# Patient Record
Sex: MALE | Race: OTHER | NOT HISPANIC OR LATINO | URBAN - METROPOLITAN AREA
[De-identification: names, ages, dates, MRNs, and addresses within clinical notes are randomized per-mention and may not be internally consistent; named-entity substitution may affect disease eponyms.]

---

## 2021-05-13 ENCOUNTER — INPATIENT (INPATIENT)
Facility: HOSPITAL | Age: 36
LOS: 0 days | Discharge: HOME | End: 2021-05-14
Attending: SURGERY | Admitting: SURGERY
Payer: COMMERCIAL

## 2021-05-13 VITALS
OXYGEN SATURATION: 99 % | SYSTOLIC BLOOD PRESSURE: 117 MMHG | DIASTOLIC BLOOD PRESSURE: 67 MMHG | RESPIRATION RATE: 18 BRPM | WEIGHT: 164.91 LBS | TEMPERATURE: 98 F | HEART RATE: 92 BPM

## 2021-05-13 LAB
ANION GAP SERPL CALC-SCNC: 10 MMOL/L — SIGNIFICANT CHANGE UP (ref 7–14)
ANION GAP SERPL CALC-SCNC: 9 MMOL/L — SIGNIFICANT CHANGE UP (ref 7–14)
APTT BLD: 41.2 SEC — HIGH (ref 27–39.2)
BASOPHILS # BLD AUTO: 0.04 K/UL — SIGNIFICANT CHANGE UP (ref 0–0.2)
BASOPHILS # BLD AUTO: 0.05 K/UL — SIGNIFICANT CHANGE UP (ref 0–0.2)
BASOPHILS NFR BLD AUTO: 0.6 % — SIGNIFICANT CHANGE UP (ref 0–1)
BASOPHILS NFR BLD AUTO: 0.6 % — SIGNIFICANT CHANGE UP (ref 0–1)
BLD GP AB SCN SERPL QL: SIGNIFICANT CHANGE UP
BUN SERPL-MCNC: 25 MG/DL — HIGH (ref 10–20)
BUN SERPL-MCNC: 26 MG/DL — HIGH (ref 10–20)
CALCIUM SERPL-MCNC: 9.1 MG/DL — SIGNIFICANT CHANGE UP (ref 8.5–10.1)
CALCIUM SERPL-MCNC: 9.7 MG/DL — SIGNIFICANT CHANGE UP (ref 8.5–10.1)
CHLORIDE SERPL-SCNC: 102 MMOL/L — SIGNIFICANT CHANGE UP (ref 98–110)
CHLORIDE SERPL-SCNC: 105 MMOL/L — SIGNIFICANT CHANGE UP (ref 98–110)
CO2 SERPL-SCNC: 26 MMOL/L — SIGNIFICANT CHANGE UP (ref 17–32)
CO2 SERPL-SCNC: 29 MMOL/L — SIGNIFICANT CHANGE UP (ref 17–32)
CREAT SERPL-MCNC: 1 MG/DL — SIGNIFICANT CHANGE UP (ref 0.7–1.5)
CREAT SERPL-MCNC: 1.1 MG/DL — SIGNIFICANT CHANGE UP (ref 0.7–1.5)
EOSINOPHIL # BLD AUTO: 0.15 K/UL — SIGNIFICANT CHANGE UP (ref 0–0.7)
EOSINOPHIL # BLD AUTO: 0.25 K/UL — SIGNIFICANT CHANGE UP (ref 0–0.7)
EOSINOPHIL NFR BLD AUTO: 1.8 % — SIGNIFICANT CHANGE UP (ref 0–8)
EOSINOPHIL NFR BLD AUTO: 3.6 % — SIGNIFICANT CHANGE UP (ref 0–8)
GLUCOSE SERPL-MCNC: 106 MG/DL — HIGH (ref 70–99)
GLUCOSE SERPL-MCNC: 91 MG/DL — SIGNIFICANT CHANGE UP (ref 70–99)
HCT VFR BLD CALC: 40.9 % — LOW (ref 42–52)
HCT VFR BLD CALC: 45.1 % — SIGNIFICANT CHANGE UP (ref 42–52)
HGB BLD-MCNC: 14.2 G/DL — SIGNIFICANT CHANGE UP (ref 14–18)
HGB BLD-MCNC: 16.2 G/DL — SIGNIFICANT CHANGE UP (ref 14–18)
IMM GRANULOCYTES NFR BLD AUTO: 0.1 % — SIGNIFICANT CHANGE UP (ref 0.1–0.3)
IMM GRANULOCYTES NFR BLD AUTO: 0.1 % — SIGNIFICANT CHANGE UP (ref 0.1–0.3)
INR BLD: 1.22 RATIO — SIGNIFICANT CHANGE UP (ref 0.65–1.3)
LYMPHOCYTES # BLD AUTO: 2.46 K/UL — SIGNIFICANT CHANGE UP (ref 1.2–3.4)
LYMPHOCYTES # BLD AUTO: 2.63 K/UL — SIGNIFICANT CHANGE UP (ref 1.2–3.4)
LYMPHOCYTES # BLD AUTO: 28.8 % — SIGNIFICANT CHANGE UP (ref 20.5–51.1)
LYMPHOCYTES # BLD AUTO: 38.3 % — SIGNIFICANT CHANGE UP (ref 20.5–51.1)
MAGNESIUM SERPL-MCNC: 1.9 MG/DL — SIGNIFICANT CHANGE UP (ref 1.8–2.4)
MCHC RBC-ENTMCNC: 29.3 PG — SIGNIFICANT CHANGE UP (ref 27–31)
MCHC RBC-ENTMCNC: 30.1 PG — SIGNIFICANT CHANGE UP (ref 27–31)
MCHC RBC-ENTMCNC: 34.7 G/DL — SIGNIFICANT CHANGE UP (ref 32–37)
MCHC RBC-ENTMCNC: 35.9 G/DL — SIGNIFICANT CHANGE UP (ref 32–37)
MCV RBC AUTO: 83.7 FL — SIGNIFICANT CHANGE UP (ref 80–94)
MCV RBC AUTO: 84.3 FL — SIGNIFICANT CHANGE UP (ref 80–94)
MONOCYTES # BLD AUTO: 0.65 K/UL — HIGH (ref 0.1–0.6)
MONOCYTES # BLD AUTO: 0.69 K/UL — HIGH (ref 0.1–0.6)
MONOCYTES NFR BLD AUTO: 10 % — HIGH (ref 1.7–9.3)
MONOCYTES NFR BLD AUTO: 7.6 % — SIGNIFICANT CHANGE UP (ref 1.7–9.3)
NEUTROPHILS # BLD AUTO: 3.25 K/UL — SIGNIFICANT CHANGE UP (ref 1.4–6.5)
NEUTROPHILS # BLD AUTO: 5.22 K/UL — SIGNIFICANT CHANGE UP (ref 1.4–6.5)
NEUTROPHILS NFR BLD AUTO: 47.4 % — SIGNIFICANT CHANGE UP (ref 42.2–75.2)
NEUTROPHILS NFR BLD AUTO: 61.1 % — SIGNIFICANT CHANGE UP (ref 42.2–75.2)
NRBC # BLD: 0 /100 WBCS — SIGNIFICANT CHANGE UP (ref 0–0)
NRBC # BLD: 0 /100 WBCS — SIGNIFICANT CHANGE UP (ref 0–0)
PHOSPHATE SERPL-MCNC: 4.8 MG/DL — SIGNIFICANT CHANGE UP (ref 2.1–4.9)
PLATELET # BLD AUTO: 127 K/UL — LOW (ref 130–400)
PLATELET # BLD AUTO: 143 K/UL — SIGNIFICANT CHANGE UP (ref 130–400)
POTASSIUM SERPL-MCNC: 3.8 MMOL/L — SIGNIFICANT CHANGE UP (ref 3.5–5)
POTASSIUM SERPL-MCNC: 4 MMOL/L — SIGNIFICANT CHANGE UP (ref 3.5–5)
POTASSIUM SERPL-SCNC: 3.8 MMOL/L — SIGNIFICANT CHANGE UP (ref 3.5–5)
POTASSIUM SERPL-SCNC: 4 MMOL/L — SIGNIFICANT CHANGE UP (ref 3.5–5)
PROTHROM AB SERPL-ACNC: 14 SEC — HIGH (ref 9.95–12.87)
RAPID RVP RESULT: SIGNIFICANT CHANGE UP
RBC # BLD: 4.85 M/UL — SIGNIFICANT CHANGE UP (ref 4.7–6.1)
RBC # BLD: 5.39 M/UL — SIGNIFICANT CHANGE UP (ref 4.7–6.1)
RBC # FLD: 11.9 % — SIGNIFICANT CHANGE UP (ref 11.5–14.5)
RBC # FLD: 12 % — SIGNIFICANT CHANGE UP (ref 11.5–14.5)
SARS-COV-2 RNA SPEC QL NAA+PROBE: SIGNIFICANT CHANGE UP
SODIUM SERPL-SCNC: 138 MMOL/L — SIGNIFICANT CHANGE UP (ref 135–146)
SODIUM SERPL-SCNC: 143 MMOL/L — SIGNIFICANT CHANGE UP (ref 135–146)
WBC # BLD: 6.87 K/UL — SIGNIFICANT CHANGE UP (ref 4.8–10.8)
WBC # BLD: 8.54 K/UL — SIGNIFICANT CHANGE UP (ref 4.8–10.8)
WBC # FLD AUTO: 6.87 K/UL — SIGNIFICANT CHANGE UP (ref 4.8–10.8)
WBC # FLD AUTO: 8.54 K/UL — SIGNIFICANT CHANGE UP (ref 4.8–10.8)

## 2021-05-13 PROCEDURE — 71045 X-RAY EXAM CHEST 1 VIEW: CPT | Mod: 26

## 2021-05-13 PROCEDURE — 93010 ELECTROCARDIOGRAM REPORT: CPT

## 2021-05-13 PROCEDURE — 99284 EMERGENCY DEPT VISIT MOD MDM: CPT

## 2021-05-13 RX ORDER — ACETAMINOPHEN 500 MG
650 TABLET ORAL EVERY 6 HOURS
Refills: 0 | Status: DISCONTINUED | OUTPATIENT
Start: 2021-05-13 | End: 2021-05-14

## 2021-05-13 RX ORDER — ACETAMINOPHEN 500 MG
975 TABLET ORAL ONCE
Refills: 0 | Status: COMPLETED | OUTPATIENT
Start: 2021-05-13 | End: 2021-05-13

## 2021-05-13 RX ORDER — OXYCODONE HYDROCHLORIDE 5 MG/1
5 TABLET ORAL EVERY 6 HOURS
Refills: 0 | Status: DISCONTINUED | OUTPATIENT
Start: 2021-05-13 | End: 2021-05-14

## 2021-05-13 RX ORDER — CIPROFLOXACIN LACTATE 400MG/40ML
400 VIAL (ML) INTRAVENOUS EVERY 12 HOURS
Refills: 0 | Status: DISCONTINUED | OUTPATIENT
Start: 2021-05-14 | End: 2021-05-14

## 2021-05-13 RX ORDER — SENNA PLUS 8.6 MG/1
2 TABLET ORAL AT BEDTIME
Refills: 0 | Status: DISCONTINUED | OUTPATIENT
Start: 2021-05-13 | End: 2021-05-14

## 2021-05-13 RX ORDER — METRONIDAZOLE 500 MG
TABLET ORAL
Refills: 0 | Status: DISCONTINUED | OUTPATIENT
Start: 2021-05-13 | End: 2021-05-14

## 2021-05-13 RX ORDER — ENOXAPARIN SODIUM 100 MG/ML
40 INJECTION SUBCUTANEOUS DAILY
Refills: 0 | Status: DISCONTINUED | OUTPATIENT
Start: 2021-05-13 | End: 2021-05-14

## 2021-05-13 RX ORDER — PANTOPRAZOLE SODIUM 20 MG/1
40 TABLET, DELAYED RELEASE ORAL
Refills: 0 | Status: DISCONTINUED | OUTPATIENT
Start: 2021-05-13 | End: 2021-05-14

## 2021-05-13 RX ORDER — IBUPROFEN 200 MG
600 TABLET ORAL ONCE
Refills: 0 | Status: COMPLETED | OUTPATIENT
Start: 2021-05-13 | End: 2021-05-13

## 2021-05-13 RX ORDER — CIPROFLOXACIN LACTATE 400MG/40ML
400 VIAL (ML) INTRAVENOUS ONCE
Refills: 0 | Status: COMPLETED | OUTPATIENT
Start: 2021-05-13 | End: 2021-05-13

## 2021-05-13 RX ORDER — CIPROFLOXACIN LACTATE 400MG/40ML
VIAL (ML) INTRAVENOUS
Refills: 0 | Status: DISCONTINUED | OUTPATIENT
Start: 2021-05-13 | End: 2021-05-14

## 2021-05-13 RX ORDER — METRONIDAZOLE 500 MG
500 TABLET ORAL ONCE
Refills: 0 | Status: COMPLETED | OUTPATIENT
Start: 2021-05-13 | End: 2021-05-13

## 2021-05-13 RX ORDER — CHLORHEXIDINE GLUCONATE 213 G/1000ML
1 SOLUTION TOPICAL
Refills: 0 | Status: DISCONTINUED | OUTPATIENT
Start: 2021-05-13 | End: 2021-05-14

## 2021-05-13 RX ORDER — IBUPROFEN 200 MG
400 TABLET ORAL EVERY 6 HOURS
Refills: 0 | Status: DISCONTINUED | OUTPATIENT
Start: 2021-05-13 | End: 2021-05-14

## 2021-05-13 RX ORDER — METRONIDAZOLE 500 MG
500 TABLET ORAL EVERY 8 HOURS
Refills: 0 | Status: DISCONTINUED | OUTPATIENT
Start: 2021-05-14 | End: 2021-05-14

## 2021-05-13 RX ORDER — SODIUM CHLORIDE 9 MG/ML
1000 INJECTION, SOLUTION INTRAVENOUS
Refills: 0 | Status: DISCONTINUED | OUTPATIENT
Start: 2021-05-13 | End: 2021-05-14

## 2021-05-13 RX ADMIN — Medication 200 MILLIGRAM(S): at 19:27

## 2021-05-13 RX ADMIN — ENOXAPARIN SODIUM 40 MILLIGRAM(S): 100 INJECTION SUBCUTANEOUS at 19:15

## 2021-05-13 RX ADMIN — Medication 650 MILLIGRAM(S): at 19:15

## 2021-05-13 RX ADMIN — Medication 650 MILLIGRAM(S): at 19:16

## 2021-05-13 RX ADMIN — SENNA PLUS 2 TABLET(S): 8.6 TABLET ORAL at 21:29

## 2021-05-13 RX ADMIN — Medication 100 MILLIGRAM(S): at 19:16

## 2021-05-13 NOTE — ED PROVIDER NOTE - PHYSICAL EXAMINATION
CONSTITUTIONAL: Well-developed; well-nourished; in no acute distress, nontoxic appearing  SKIN: skin exam is warm and dry  ENT: MMM  NECK: ROM intact.  CARD: S1, S2 normal, no murmur  RESP: No wheezes, rales or rhonchi. Good air movement bilaterally  ABD: soft; non-distended; non-tender. No Rebound, No guarding. JOSE: thrombosed, strangulated hernia, no active drainage. Chaperone: Jina  EXT: Normal ROM.   NEURO: awake, alert, following commands, oriented, grossly unremarkable. No Focal deficits. GCS 15.   PSYCH: Cooperative, appropriate.

## 2021-05-13 NOTE — H&P ADULT - ATTENDING COMMENTS
above noted discussed case with surgical resident abdomen soft no distension rectal exam shows large thrombosed hemmorhoids extremly painful for surgery in am

## 2021-05-13 NOTE — ED PROVIDER NOTE - PROGRESS NOTE DETAILS
edrek: patient evaluated by surgery, will admit to [facility] for operative intervention of thrombosed hemorrhoid Attending Note: 34 y/o M presents to ED c/o rectal pain x few days. Pt has multiple thrombosed hemorrhoids.  Denies fever, n/v/d, or abdominal pain. Pt seen by surgery at bedside. Will admit for operative intervention under anesthesia.

## 2021-05-13 NOTE — ED PROVIDER NOTE - CLINICAL SUMMARY MEDICAL DECISION MAKING FREE TEXT BOX
Pt has multiple thrombosed hemorrhoids.  Denies fever, n/v/d, or abdominal pain. Pt seen by surgery at bedside. Will admit for operative intervention under anesthesia.

## 2021-05-13 NOTE — ED PROVIDER NOTE - OBJECTIVE STATEMENT
35 year old male, no past medical history, who presents with hemorrhoids. patient reports straining during BM resulting in hemorrhoid, previously was able to reduce hemorrhoid, however, has been unable to do so x2 days. patient reports persistent, worsening pain to rectum. denies f/c, nausea/vomiting/diarrhea, abd pain.

## 2021-05-13 NOTE — ED PROVIDER NOTE - NS ED ROS FT
Review of Systems:  	•	CONSTITUTIONAL: no fever, no diaphoresis, no chills  	•	SKIN: no rash  	•	HEMATOLOGIC: no bleeding, no bruising  	•	ENT: no sore throat  	•	RESPIRATORY: no shortness of breath, no cough  	•	CARDIAC: no chest pain, no palpitations  	•	GI: +hemorrhoids, no abd pain/nausea/vomiting/diarrhea  	•	MUSCULOSKELETAL: no joint paint, no swelling, no redness  	•	NEUROLOGIC: no weakness, no headache

## 2021-05-13 NOTE — H&P ADULT - HISTORY OF PRESENT ILLNESS
35M w/ PMH of hemorrhoids who presented to the ED with 2 days of worsening prolapsed hemorrhoids. Pt reports long history of hemorrhoids, never treated surgically, just with topical creams. In the past 2 days, he has had increasing rectal pain, bleeding, and prolapse of hemorrhoids. Referred to come into ED by his PCP. Admitted for surgery tomorrow.

## 2021-05-13 NOTE — H&P ADULT - ASSESSMENT
35M w/ PMH of hemorrhoids who presented to the ED with 2 days of worsening prolapsed hemorrhoids. Pt reports long history of hemorrhoids, never treated surgically, just with topical creams. In the past 2 days, he has had increasing rectal pain, bleeding, and prolapse of hemorrhoids. Referred to come into ED by his PCP. Admitted for surgery tomorrow.     PLAN:   - cipro/flagyl  - NPO, IVF at midnight  - booked for OR tomorrow w/ Dr. Javier at 7:30am for exam under anesthesia and hemorrhoidectomy  - consent in chart  - d/w pt and Dr. Javier

## 2021-05-13 NOTE — ED ADULT NURSE NOTE - OBJECTIVE STATEMENT
Pt presented with c/o rectal pain/hemorrhoids. Pt states that he has been able to decompress them himself at home, but now states pain has increased. Pt went to PCP who sent to ED for surgical eval.

## 2021-05-13 NOTE — H&P ADULT - NSHPLABSRESULTS_GEN_ALL_CORE
Labs:             16.2   8.54  )-----------( 143      ( 13 May 2021 17:14 )             45.1       Auto Neutrophil %: 61.1 % (05-13-21 @ 17:14)  Auto Immature Granulocyte %: 0.1 % (05-13-21 @ 17:14)    05-13    138  |  102  |  25<H>  ----------------------------<  91  4.0   |  26  |  1.0    Calcium, Total Serum: 9.7 mg/dL (05-13-21 @ 17:14)

## 2021-05-13 NOTE — H&P ADULT - NSHPPHYSICALEXAM_GEN_ALL_CORE
GENERAL: NAD, well-appearing  CHEST/LUNG: Clear to auscultation bilaterally  HEART: Regular rate and rhythm  ABDOMEN: Soft, Nontender, Nondistended; thrombosed external/internal hemorrhoids, exquisitely TTP  EXTREMITIES: No clubbing, cyanosis, or edema

## 2021-05-14 VITALS
SYSTOLIC BLOOD PRESSURE: 109 MMHG | TEMPERATURE: 97 F | DIASTOLIC BLOOD PRESSURE: 62 MMHG | HEART RATE: 94 BPM | RESPIRATION RATE: 17 BRPM

## 2021-05-14 LAB
COVID-19 SPIKE DOMAIN AB INTERP: POSITIVE
COVID-19 SPIKE DOMAIN ANTIBODY RESULT: >250 U/ML — HIGH
SARS-COV-2 IGG+IGM SERPL QL IA: >250 U/ML — HIGH
SARS-COV-2 IGG+IGM SERPL QL IA: POSITIVE

## 2021-05-14 PROCEDURE — 88304 TISSUE EXAM BY PATHOLOGIST: CPT | Mod: 26

## 2021-05-14 PROCEDURE — 88360 TUMOR IMMUNOHISTOCHEM/MANUAL: CPT | Mod: 26

## 2021-05-14 PROCEDURE — 88341 IMHCHEM/IMCYTCHM EA ADD ANTB: CPT | Mod: 26,59

## 2021-05-14 PROCEDURE — 88342 IMHCHEM/IMCYTCHM 1ST ANTB: CPT | Mod: 26,59

## 2021-05-14 RX ORDER — SENNA PLUS 8.6 MG/1
2 TABLET ORAL AT BEDTIME
Refills: 0 | Status: DISCONTINUED | OUTPATIENT
Start: 2021-05-14 | End: 2021-05-14

## 2021-05-14 RX ORDER — ACETAMINOPHEN 500 MG
650 TABLET ORAL EVERY 6 HOURS
Refills: 0 | Status: DISCONTINUED | OUTPATIENT
Start: 2021-05-14 | End: 2021-05-14

## 2021-05-14 RX ORDER — HYDROMORPHONE HYDROCHLORIDE 2 MG/ML
0.5 INJECTION INTRAMUSCULAR; INTRAVENOUS; SUBCUTANEOUS EVERY 6 HOURS
Refills: 0 | Status: DISCONTINUED | OUTPATIENT
Start: 2021-05-14 | End: 2021-05-14

## 2021-05-14 RX ORDER — CIPROFLOXACIN LACTATE 400MG/40ML
400 VIAL (ML) INTRAVENOUS EVERY 12 HOURS
Refills: 0 | Status: DISCONTINUED | OUTPATIENT
Start: 2021-05-14 | End: 2021-05-14

## 2021-05-14 RX ORDER — CHLORHEXIDINE GLUCONATE 213 G/1000ML
1 SOLUTION TOPICAL
Refills: 0 | Status: DISCONTINUED | OUTPATIENT
Start: 2021-05-14 | End: 2021-05-14

## 2021-05-14 RX ORDER — IBUPROFEN 200 MG
400 TABLET ORAL EVERY 6 HOURS
Refills: 0 | Status: DISCONTINUED | OUTPATIENT
Start: 2021-05-14 | End: 2021-05-14

## 2021-05-14 RX ORDER — METRONIDAZOLE 500 MG
500 TABLET ORAL EVERY 8 HOURS
Refills: 0 | Status: DISCONTINUED | OUTPATIENT
Start: 2021-05-14 | End: 2021-05-14

## 2021-05-14 RX ORDER — SENNA PLUS 8.6 MG/1
2 TABLET ORAL
Qty: 0 | Refills: 0 | DISCHARGE
Start: 2021-05-14

## 2021-05-14 RX ORDER — OXYCODONE HYDROCHLORIDE 5 MG/1
5 TABLET ORAL EVERY 6 HOURS
Refills: 0 | Status: DISCONTINUED | OUTPATIENT
Start: 2021-05-14 | End: 2021-05-14

## 2021-05-14 RX ORDER — PANTOPRAZOLE SODIUM 20 MG/1
40 TABLET, DELAYED RELEASE ORAL
Refills: 0 | Status: DISCONTINUED | OUTPATIENT
Start: 2021-05-14 | End: 2021-05-14

## 2021-05-14 RX ORDER — ENOXAPARIN SODIUM 100 MG/ML
40 INJECTION SUBCUTANEOUS DAILY
Refills: 0 | Status: DISCONTINUED | OUTPATIENT
Start: 2021-05-14 | End: 2021-05-14

## 2021-05-14 RX ADMIN — PANTOPRAZOLE SODIUM 40 MILLIGRAM(S): 20 TABLET, DELAYED RELEASE ORAL at 05:24

## 2021-05-14 RX ADMIN — OXYCODONE HYDROCHLORIDE 5 MILLIGRAM(S): 5 TABLET ORAL at 18:29

## 2021-05-14 RX ADMIN — Medication 650 MILLIGRAM(S): at 02:55

## 2021-05-14 RX ADMIN — Medication 400 MILLIGRAM(S): at 00:00

## 2021-05-14 RX ADMIN — Medication 100 MILLIGRAM(S): at 05:24

## 2021-05-14 RX ADMIN — ENOXAPARIN SODIUM 40 MILLIGRAM(S): 100 INJECTION SUBCUTANEOUS at 11:38

## 2021-05-14 RX ADMIN — Medication 650 MILLIGRAM(S): at 05:24

## 2021-05-14 RX ADMIN — Medication 100 MILLIGRAM(S): at 15:18

## 2021-05-14 RX ADMIN — Medication 200 MILLIGRAM(S): at 17:17

## 2021-05-14 RX ADMIN — Medication 400 MILLIGRAM(S): at 17:17

## 2021-05-14 RX ADMIN — Medication 400 MILLIGRAM(S): at 05:24

## 2021-05-14 RX ADMIN — Medication 400 MILLIGRAM(S): at 18:11

## 2021-05-14 NOTE — DISCHARGE NOTE PROVIDER - NSDCCPCAREPLAN_GEN_ALL_CORE_FT
PRINCIPAL DISCHARGE DIAGNOSIS  Diagnosis: Hemorrhoids, thrombosed  Assessment and Plan of Treatment:       SECONDARY DISCHARGE DIAGNOSES  Diagnosis: Strangulated hemorrhoids  Assessment and Plan of Treatment:

## 2021-05-14 NOTE — DISCHARGE NOTE PROVIDER - CARE PROVIDER_API CALL
Katia Javier  SURGERY  [address] [place] [address]  Phone: [phone]  Fax: [phone]  Follow Up Time: 2 weeks

## 2021-05-14 NOTE — DISCHARGE NOTE NURSING/CASE MANAGEMENT/SOCIAL WORK - PATIENT PORTAL LINK FT
You can access the FollowMyHealth Patient Portal offered by [facility] by registering at the following website: [email or web address]. By joining [employer]’s FollowMyHealth portal, you will also be able to view your health information using other applications (apps) compatible with our system.

## 2021-05-14 NOTE — CHART NOTE - NSCHARTNOTEFT_GEN_A_CORE
PACU ANESTHESIA ADMISSION NOTE      Procedure: Hemorrhoidectomy, thrombosed      Post op diagnosis:  Hemorrhoids        ____  Intubated  TV:______       Rate: ______      FiO2: ______    __x__  Patent Airway    __x__  Full return of protective reflexes    __x__  Full recovery from anesthesia / back to baseline     Vitals:   See Anesthesia record  T- 97.8n P- 61 R- 17 B/P- 124/73 SPO2- 96% on RA    Mental Status:  __x__ Awake   _____ Alert   __x___ Drowsy   _____ Sedated    Nausea/Vomiting:  __x__ NO  ______Yes,   See Post - Op Orders          Pain Scale (0-10):  __0___    Treatment: ____ None    ____ See Post - Op/PCA Orders    Post - Operative Fluids:   ____ Oral   __x__ See Post - Op Orders    Plan: Discharge:   ____Home       __x___Floor     _____Critical Care    _____  Other:_________________    Comments: No anesthesia complications noted. Discharge/READMIT to FLOOR when criteria met.

## 2021-05-14 NOTE — PROGRESS NOTE ADULT - SUBJECTIVE AND OBJECTIVE BOX
GENERAL SURGERY PROGRESS NOTE     ADAM KIMBROUGH  35y  Male  Hospital day :1d    OVERNIGHT EVENTS: none    T(F): 96.3 (05-14-21 @ 00:58), Max: 98.5 (05-13-21 @ 14:30)  HR: 60 (05-14-21 @ 00:58) (60 - 92)  BP: 91/51 (05-14-21 @ 00:58) (91/51 - 177/64)  ABP: --  ABP(mean): --  RR: 18 (05-14-21 @ 00:58) (18 - 18)  SpO2: 99% (05-13-21 @ 14:30) (99% - 99%)    DIET/FLUIDS: lactated ringers. 1000 milliLiter(s) IV Continuous <Continuous>    GI proph:  pantoprazole    Tablet 40 milliGRAM(s) Oral before breakfast    AC/ proph: enoxaparin Injectable 40 milliGRAM(s) SubCutaneous daily    ABx: ciprofloxacin   IVPB 400 milliGRAM(s) IV Intermittent every 12 hours  ciprofloxacin   IVPB      metroNIDAZOLE  IVPB 500 milliGRAM(s) IV Intermittent every 8 hours  metroNIDAZOLE  IVPB        PHYSICAL EXAM:  GENERAL: NAD, well-appearing  CHEST/LUNG: Clear to auscultation bilaterally  HEART: Regular rate and rhythm  ABDOMEN: Soft, Nontender, Nondistended;   EXTREMITIES:  No clubbing, cyanosis, or edema    LABS  Labs:  CAPILLARY BLOOD GLUCOSE                      14.2   6.87  )-----------( 127      ( 13 May 2021 22:09 )             40.9       Auto Neutrophil %: 47.4 % (05-13-21 @ 22:09)  Auto Immature Granulocyte %: 0.1 % (05-13-21 @ 22:09)  Auto Neutrophil %: 61.1 % (05-13-21 @ 17:14)  Auto Immature Granulocyte %: 0.1 % (05-13-21 @ 17:14)    05-13    143  |  105  |  26<H>  ----------------------------<  106<H>  3.8   |  29  |  1.1    Calcium, Total Serum: 9.1 mg/dL (05-13-21 @ 22:09)    Coags:     14.00  ----< 1.22    ( 13 May 2021 22:09 )     41.2      RADIOLOGY & ADDITIONAL TESTS: reviewed    A/P: 34 yo M admitted with thrombosed hemorrhoids  -OR today for EUA/hemorrhoidectomy  -pain control prn  -IS, OOB ad avni  -NPO, IVF  -DVT prophpylaxis  -antibiotics for prophylaxis

## 2021-05-14 NOTE — DISCHARGE NOTE PROVIDER - HOSPITAL COURSE
35 year old male s/p hemorrhoidectomy for prolapsed hemorrhoids. Patient given discharge instructions and told to follow up with Dr. Javier in 2 weeks

## 2021-05-14 NOTE — DISCHARGE NOTE NURSING/CASE MANAGEMENT/SOCIAL WORK - REASON VACCINE NOT RECEIVED
pt has appointment scheduled for tomorrow for 2nd dose of moderna, we only have J&J currently as per MIQUEL TROTTER

## 2021-05-14 NOTE — DISCHARGE NOTE PROVIDER - NSDCMRMEDTOKEN_GEN_ALL_CORE_FT
oxycodone-acetaminophen 5 mg-325 mg oral tablet: 1 tab(s) orally every 4 hours -for severe pain MDD:4   senna oral tablet: 2 tab(s) orally once a day (at bedtime)

## 2021-05-14 NOTE — PRE-ANESTHESIA EVALUATION ADULT - NSANTHADDINFOFT_GEN_ALL_CORE
Procedure/Risks discussed for GA with LMA v. ETT + routine monitoring. Patient understands the stated anesthetic plan and agrees to proceed.

## 2021-05-15 ENCOUNTER — EMERGENCY (EMERGENCY)
Facility: HOSPITAL | Age: 36
LOS: 0 days | Discharge: HOME | End: 2021-05-15
Attending: STUDENT IN AN ORGANIZED HEALTH CARE EDUCATION/TRAINING PROGRAM | Admitting: STUDENT IN AN ORGANIZED HEALTH CARE EDUCATION/TRAINING PROGRAM
Payer: COMMERCIAL

## 2021-05-15 VITALS
DIASTOLIC BLOOD PRESSURE: 89 MMHG | TEMPERATURE: 97 F | HEART RATE: 78 BPM | RESPIRATION RATE: 18 BRPM | OXYGEN SATURATION: 96 % | SYSTOLIC BLOOD PRESSURE: 127 MMHG

## 2021-05-15 DIAGNOSIS — K62.5 HEMORRHAGE OF ANUS AND RECTUM: ICD-10-CM

## 2021-05-15 DIAGNOSIS — Z98.890 OTHER SPECIFIED POSTPROCEDURAL STATES: ICD-10-CM

## 2021-05-15 DIAGNOSIS — K64.8 OTHER HEMORRHOIDS: ICD-10-CM

## 2021-05-15 PROBLEM — K64.9 UNSPECIFIED HEMORRHOIDS: Chronic | Status: ACTIVE | Noted: 2021-05-13

## 2021-05-15 LAB
BASOPHILS # BLD AUTO: 0.04 K/UL — SIGNIFICANT CHANGE UP (ref 0–0.2)
BASOPHILS NFR BLD AUTO: 0.4 % — SIGNIFICANT CHANGE UP (ref 0–1)
EOSINOPHIL # BLD AUTO: 0.17 K/UL — SIGNIFICANT CHANGE UP (ref 0–0.7)
EOSINOPHIL NFR BLD AUTO: 1.6 % — SIGNIFICANT CHANGE UP (ref 0–8)
HCT VFR BLD CALC: 42 % — SIGNIFICANT CHANGE UP (ref 42–52)
HGB BLD-MCNC: 14.7 G/DL — SIGNIFICANT CHANGE UP (ref 14–18)
IMM GRANULOCYTES NFR BLD AUTO: 0.3 % — SIGNIFICANT CHANGE UP (ref 0.1–0.3)
LYMPHOCYTES # BLD AUTO: 29.5 % — SIGNIFICANT CHANGE UP (ref 20.5–51.1)
LYMPHOCYTES # BLD AUTO: 3.23 K/UL — SIGNIFICANT CHANGE UP (ref 1.2–3.4)
MCHC RBC-ENTMCNC: 29.6 PG — SIGNIFICANT CHANGE UP (ref 27–31)
MCHC RBC-ENTMCNC: 35 G/DL — SIGNIFICANT CHANGE UP (ref 32–37)
MCV RBC AUTO: 84.7 FL — SIGNIFICANT CHANGE UP (ref 80–94)
MONOCYTES # BLD AUTO: 0.88 K/UL — HIGH (ref 0.1–0.6)
MONOCYTES NFR BLD AUTO: 8 % — SIGNIFICANT CHANGE UP (ref 1.7–9.3)
NEUTROPHILS # BLD AUTO: 6.61 K/UL — HIGH (ref 1.4–6.5)
NEUTROPHILS NFR BLD AUTO: 60.2 % — SIGNIFICANT CHANGE UP (ref 42.2–75.2)
NRBC # BLD: 0 /100 WBCS — SIGNIFICANT CHANGE UP (ref 0–0)
PLATELET # BLD AUTO: 132 K/UL — SIGNIFICANT CHANGE UP (ref 130–400)
RBC # BLD: 4.96 M/UL — SIGNIFICANT CHANGE UP (ref 4.7–6.1)
RBC # FLD: 11.9 % — SIGNIFICANT CHANGE UP (ref 11.5–14.5)
WBC # BLD: 10.96 K/UL — HIGH (ref 4.8–10.8)
WBC # FLD AUTO: 10.96 K/UL — HIGH (ref 4.8–10.8)

## 2021-05-15 PROCEDURE — 99284 EMERGENCY DEPT VISIT MOD MDM: CPT

## 2021-05-15 NOTE — ED PROVIDER NOTE - PHYSICAL EXAMINATION
CONSTITUTIONAL: Well-appearing; well-nourished; in no apparent distress.   NECK: Supple; non-tender; no cervical lymphadenopathy.  CARDIOVASCULAR: Normal S1, S2; no murmurs, rubs, or gallops.   RESPIRATORY: Normal chest excursion with respiration; breath sounds clear and equal bilaterally; no wheezes, rhonchi, or rales.  GI/: Normal bowel sounds; non-distended; non-tender; no palpable organomegaly.   Rectal exam chaperoned by OBDULIA Mcgregor: + small amount of bleeding noted from rectum.   MS: No evidence of trauma or deformity. Normal ROM in all four extremities; non-tender to palpation; distal pulses are normal.   SKIN: Normal for age and race; warm; dry; good turgor; no apparent lesions or exudate.   NEURO/PSYCH: A & O x 4; grossly unremarkable. mood and manner are appropriate.

## 2021-05-15 NOTE — ED ADULT TRIAGE NOTE - CHIEF COMPLAINT QUOTE
Patient stated he had surgery yesterday for hernioids and has had rectal bleeding since discharge Patient denies CP SOB and dizziness

## 2021-05-15 NOTE — ED PROVIDER NOTE - ATTENDING CONTRIBUTION TO CARE
36 yo m, s/p hemorrhoidectomy yesterday w/ Dr. Javier  pt had mild int bleeding last night. since 11am, bleeding has been constant, dripping. + mild pain to surg area. pt feels like he needs to have a BM and when he strains his bleeding gets worse.    vss  gen- NAD, aaox3  card-rrr  lungs-ctab, no wheezing or rhonchi  abd-sntnd, no guarding or rebound, Rectal- oozing from surgical site      well appearing, nml vs  will c/s surg to help manage post op bleeding/oozing 36 yo m, s/p hemorrhoidectomy yesterday w/ Dr. Javier  pt had mild int bleeding last night. since 11am, bleeding has been constant, dripping. + mild pain to surg area. pt feels like he needs to have a BM and when he strains his bleeding gets worse.     nml vs, cbc  will c/s surg to help manage post op bleeding/oozing

## 2021-05-15 NOTE — CONSULT NOTE ADULT - ASSESSMENT
35yM w/ PMHx of hemorrhoids s/p hemorrhoidectomy 5/14/21 who presented with pain and bleeding from the rectum. Physical exam findings, imaging, and labs as documented above.     PLAN:  - no acute surgical intervention  - follow up outpatient   - return to the ED if persistent bleeding occurs      35yM w/ PMHx of hemorrhoids s/p hemorrhoidectomy 5/14/21 who presented with pain and bleeding from the rectum. Physical exam findings, imaging, and labs as documented above. Hgb stable (14.2 from 14.7 pre op)    PLAN:  - no acute surgical intervention  - follow up outpatient   - return to the ED if persistent bleeding occurs      35yM w/ PMHx of hemorrhoids s/p hemorrhoidectomy 5/14/21 who presented with pain and bleeding from the rectum. Physical exam findings, imaging, and labs as documented above. Hgb stable (14.2 from 14.7 pre op)    PLAN:  - no acute surgical intervention  - follow up outpatient   - return to the ED if persistent bleeding occurs     Senior Resident Addendum  As above, with expected pain and bloody drainage since surgery, hgb stable from pre-op labs, incision examined, no active bleed noted, old clot evacuated. Patient reports desire for outpatient follow-up. Recommended to patient to avoid straining with bowel movements and to avoid constipation. No further intervention at this time, patient will follow-up in clinic this upcoming week.  Case d/w patient, wife, Dr. Javier, ED.

## 2021-05-15 NOTE — ED PROVIDER NOTE - CLINICAL SUMMARY MEDICAL DECISION MAKING FREE TEXT BOX
pt presented for oozing post hemorrhoidectomy. pt seen by gen surg, and cleared for outpt f/u. pt eloped prior to getting papers

## 2021-05-15 NOTE — CONSULT NOTE ADULT - SUBJECTIVE AND OBJECTIVE BOX
GENERAL SURGERY CONSULT NOTE    Patient: ADAM KIMBROUGH , 35y ([birth year])Male   MRN: [id number]  Location: [facility] ED  Visit: 05-15-21 Emergency  Date: 05-15-21 @ 17:21    HPI:  Patient is a 36 y/o M with PMHx of hemorrhoids s/p hemorrhoidectomy yesterday 5/14 with Dr. Javier. Patient states that he has been having increased pain and bleeding from his rectum since the surgery. States that he has the urge to defecate but cannot; only blood comes out. Has not been able to have a bowel movement since the surgery. He has been taking the pain medication that has been prescribed to him.     PAST MEDICAL & SURGICAL HISTORY:  Hemorrhoids    Home Medications:  senna oral tablet: 2 tab(s) orally once a day (at bedtime) (14 May 2021 17:57)        VITALS:  T(F): 97.3 (05-15-21 @ 14:07), Max: 97.3 (05-15-21 @ 14:07)  HR: 78 (05-15-21 @ 14:07) (78 - 78)  BP: 127/89 (05-15-21 @ 14:07) (127/89 - 127/89)  RR: 18 (05-15-21 @ 14:07) (18 - 18)  SpO2: 96% (05-15-21 @ 14:07) (96% - 96%)    PHYSICAL EXAM:  General: NAD, AAOx3, calm and cooperative  HEENT: NCAT, SONA, EOMI, Trachea ML, Neck supple  Cardiac: RRR S1, S2, no Murmurs, rubs or gallops  Respiratory: CTAB, normal respiratory effort, breath sounds equal BL, no wheeze, rhonchi or crackles  Abdomen: Soft, non-distended, non-tender, no rebound, no guarding. +BS.  Rectal: No active bleeding seen. One large clot present. Dried blood seen on the buttocks and gauze  Incision/wound: healing well, dressings in place, clean, dry and intact      LAB/STUDIES:                        14.7   10.96 )-----------( 132      ( 15 May 2021 15:01 )             42.0     05-13    143  |  105  |  26<H>  ----------------------------<  106<H>  3.8   |  29  |  1.1    Ca    9.1      13 May 2021 22:09  Phos  4.8     05-13  Mg     1.9     05-13      PT/INR - ( 13 May 2021 22:09 )   PT: 14.00 sec;   INR: 1.22 ratio         PTT - ( 13 May 2021 22:09 )  PTT:41.2 sec      IMAGING:  N/A

## 2021-05-15 NOTE — ED PROVIDER NOTE - NS ED ROS FT
Constitutional: no fever, chills, no recent weight loss, change in appetite or malaise  Cardiac: No chest pain, SOB or edema.  Respiratory: No cough or respiratory distress  GI: No nausea, vomiting, diarrhea or abdominal pain. + rectal bleeding  : No dysuria, frequency, urgency or hematuria  MS: no pain to back or extremities, no loss of ROM, no weakness  Neuro: No headache or weakness. No LOC.  Skin: No skin rash.  Endocrine: No history of thyroid disease or diabetes.  Except as documented in the HPI, all other systems are negative.

## 2021-05-15 NOTE — ED ADULT NURSE NOTE - NSIMPLEMENTINTERV_GEN_ALL_ED
Implemented All Universal Safety Interventions:  [place] to call system. Call bell, personal items and telephone within reach. Instruct patient to call for assistance. Room bathroom lighting operational. Non-slip footwear when patient is off stretcher. Physically safe environment: no spills, clutter or unnecessary equipment. Stretcher in lowest position, wheels locked, appropriate side rails in place.

## 2021-05-15 NOTE — ED PROVIDER NOTE - OBJECTIVE STATEMENT
35 year old M with hx of prolapsed hemorrhoid s/p hemorrhoidectomy yesterday with Dr. mccormick c/o rectal bleeding. Pt sts was having some intermittent rectal bleeding last night but since 11am this morning has had constant oozing. Pt sts has urge to have bowel movement but when he strains bleeding increases. No ac/ap use, abd pain, nausea, vomiting, fever/chills, lightheadedness/syncope/

## 2021-05-17 ENCOUNTER — EMERGENCY (EMERGENCY)
Facility: HOSPITAL | Age: 36
LOS: 0 days | Discharge: HOME | End: 2021-05-17
Attending: EMERGENCY MEDICINE | Admitting: EMERGENCY MEDICINE
Payer: COMMERCIAL

## 2021-05-17 VITALS
TEMPERATURE: 98 F | OXYGEN SATURATION: 100 % | RESPIRATION RATE: 18 BRPM | HEART RATE: 97 BPM | DIASTOLIC BLOOD PRESSURE: 74 MMHG | WEIGHT: 166.01 LBS | SYSTOLIC BLOOD PRESSURE: 117 MMHG

## 2021-05-17 DIAGNOSIS — K59.00 CONSTIPATION, UNSPECIFIED: ICD-10-CM

## 2021-05-17 PROCEDURE — 74018 RADEX ABDOMEN 1 VIEW: CPT | Mod: 26

## 2021-05-17 PROCEDURE — 99284 EMERGENCY DEPT VISIT MOD MDM: CPT

## 2021-05-17 RX ORDER — MAGNESIUM HYDROXIDE 400 MG/1
15 TABLET, CHEWABLE ORAL
Qty: 105 | Refills: 0
Start: 2021-05-17 | End: 2021-05-23

## 2021-05-17 RX ORDER — MINERAL OIL
1 OIL (ML) MISCELLANEOUS ONCE
Refills: 0 | Status: COMPLETED | OUTPATIENT
Start: 2021-05-17 | End: 2021-05-17

## 2021-05-17 RX ORDER — POLYETHYLENE GLYCOL 3350 17 G/17G
17 POWDER, FOR SOLUTION ORAL
Qty: 119 | Refills: 0
Start: 2021-05-17 | End: 2021-05-23

## 2021-05-17 RX ORDER — SENNA PLUS 8.6 MG/1
2 TABLET ORAL ONCE
Refills: 0 | Status: COMPLETED | OUTPATIENT
Start: 2021-05-17 | End: 2021-05-17

## 2021-05-17 RX ORDER — OXYCODONE AND ACETAMINOPHEN 5; 325 MG/1; MG/1
1 TABLET ORAL EVERY 4 HOURS
Refills: 0 | Status: DISCONTINUED | OUTPATIENT
Start: 2021-05-17 | End: 2021-05-17

## 2021-05-17 RX ORDER — MAGNESIUM HYDROXIDE 400 MG/1
30 TABLET, CHEWABLE ORAL ONCE
Refills: 0 | Status: COMPLETED | OUTPATIENT
Start: 2021-05-17 | End: 2021-05-17

## 2021-05-17 RX ADMIN — MAGNESIUM HYDROXIDE 30 MILLILITER(S): 400 TABLET, CHEWABLE ORAL at 16:22

## 2021-05-17 RX ADMIN — SENNA PLUS 2 TABLET(S): 8.6 TABLET ORAL at 16:22

## 2021-05-17 RX ADMIN — OXYCODONE AND ACETAMINOPHEN 1 TABLET(S): 5; 325 TABLET ORAL at 20:18

## 2021-05-17 RX ADMIN — OXYCODONE AND ACETAMINOPHEN 1 TABLET(S): 5; 325 TABLET ORAL at 14:05

## 2021-05-17 RX ADMIN — Medication 1 ENEMA: at 16:23

## 2021-05-17 RX ADMIN — Medication 10 MILLIGRAM(S): at 20:17

## 2021-05-17 NOTE — CONSULT NOTE ADULT - ASSESSMENT
ASSESSMENT:  35y Male pod3 from hemorrhoidectomy, with complaints of constipation. No BM since surgery, but is passing flatus.    PLAN:   -no acute surgical intervention  -rectal exam with no stool  -administer milk of mag & TW/oil enemas  -reassessment for BM        Above plan discussed with Dr. Javier, patient, and [facility] team    --------------------------------------------------------------------------------------    05-17-21 @ 15:54 ASSESSMENT:  35y Male pod3 from hemorrhoidectomy, with complaints of constipation. No BM since surgery, but is passing flatus.    PLAN:   -no acute surgical intervention  -rectal exam with no stool  -administer milk of mag & TW/oil enemas  -Can additionally administer go-lytely  -If patient has BM may be discharged       Above plan discussed with Dr. Javier, patient, and [facility] team    --------------------------------------------------------------------------------------    05-17-21 @ 15:54

## 2021-05-17 NOTE — ED PROVIDER NOTE - OBJECTIVE STATEMENT
35yoM w/ pmhx of muscular dystrophy and s/p hemorrhoidectomy 4d ago sent in by gen surgeon Dr. Javier for constipation. patient was d/joshua 4d ago, came back 2d ago for pain and bloody rectum but surgery evaluated and stated pt was fine. he went home and has not had a bowel movement since the surgery day. he passes flatus. now he's unable to urinate. no abdominal pain. endorses pain around surgical site but no discharge. no fever c/n/v/cp/sob.

## 2021-05-17 NOTE — ED PROVIDER NOTE - CLINICAL SUMMARY MEDICAL DECISION MAKING FREE TEXT BOX
pt presented with constipation , on percocet, understands side effects of these meds, had small bowel movement in ed, surgery evaluated pt, sent home on stool softeners and enema which pt feels comfortable using at home, understands proper diet, abd re exam soft ntnd, no r/g, tolerated po, aware of signs and symptoms to return for, will follow up with surgeon Dr. Javier as discussed.

## 2021-05-17 NOTE — ED PROVIDER NOTE - CARE PROVIDER_API CALL
Katia Javier  SURGERY  [address] [place] [address]  Phone: [phone]  Fax: [phone]  Follow Up Time: 1-3 Days

## 2021-05-17 NOTE — ED ADULT NURSE NOTE - OBJECTIVE STATEMENT
Patient presents to ED with complaints of constipation for 3 days- sent in by Dr. Javier. Patient reports passing flatus but has been unable to have a BM since surgery 3 days ago.

## 2021-05-17 NOTE — ED PROVIDER NOTE - PHYSICAL EXAMINATION
CONSTITUTIONAL: Well-developed; well-nourished; in no acute distress.   SKIN: warm, dry  CARD: S1, S2 normal; no murmurs, gallops, or rubs. Regular rate and rhythm  RESP: No wheezes, rales or rhonchi  ABD: soft ntnd  : well healing dry intact surgical wound, tender to touch around surgical site  EXT: Normal ROM.  No clubbing, cyanosis or edema.   NEURO: Alert, oriented, grossly unremarkable  PSYCH: Cooperative, appropriate

## 2021-05-17 NOTE — ED PROVIDER NOTE - PROGRESS NOTE DETAILS
RK: surgery consulted (DR. conklin surgery resident). he assessed patient, spoke with Dr. Javier. milk of magnesia and enema recommended. he can be safely d/joshua if he has a BM. s/o to Dr. Shea pending BM/reeval ED Attending ANDREEA Shea  36 y/o m w /pmhx of muscular dystrophy presented for constipation. Pt is pod day 3 s/p hemorrhoidectomy, stated no BM since surgery, but is passing flatus. chronic pain on percocet at home. Surgery evaluted pt and report :"    -no acute surgical intervention  -rectal exam with no stool  -administer milk of mag & TW/oil enemas  -reassessment for BM"    pt on percocet at home and understands effects, surgery team and pt report dose of pain medication in ed as pt due for it. pt aware of plan, milk of blake, enema, senna ordered as well, will reassess. ED Attending ANDREEA Shea  Pt had still not had a BM., attempted and feeling like he is straining, no stool in vault, KUB added, surgery aware, will come see pt. ED Attending ANDREEA Shea  attempt for fleet enema and rectal suppository, pt would not tolerate meds rectally, took only oral meds, no bowel movement, surgery made aware, report will come see pt. Pt able to pass small bm after suppository. d/w surgery, can be discharged with laxatives and enemas, f/u with dr. mccormick outpatient.

## 2021-05-17 NOTE — ED PROVIDER NOTE - PATIENT PORTAL LINK FT
You can access the FollowMyHealth Patient Portal offered by [facility] by registering at the following website: [email or web address]. By joining [employer]’s FollowMyHealth portal, you will also be able to view your health information using other applications (apps) compatible with our system.

## 2021-05-17 NOTE — ED PROVIDER NOTE - ATTENDING CONTRIBUTION TO CARE
36yo M history of MD presenting with constipation- pre pt, had hemorrhoidectomy 5/14, evaluated for pain to site since, discharged, presenting for constipation. Per pt, on opioids for home meds, uses miralax daily, no relief. +passing gas. no fevers/chills, nausea/vomiting/diarrhea. Well appearing, NAD, non toxic. NCAT PERRLA EOMI neck supple non tender normal wob cta bl rrr abdomen s nt nd no rebound no guarding rectal exam as above WWPx4 neuro non focal

## 2021-05-17 NOTE — CONSULT NOTE ADULT - SUBJECTIVE AND OBJECTIVE BOX
HPI: 35y Male  HPI:  35yoM w/ pmhx of muscular dystrophy and s/p hemorrhoidectomy, POD3, sent into the ED for constipation. Patient states that he had not had a bowel movement since surgery associated with rectal pain. Patient admits to passing flatus but no BM. He is tolerating a regular diet without nausea or vomiting. He was recently seen in the ED 2 days ago for rectal bleeding which has since resolved. Denies fever chills, abdominal pain, nausea.    PAST MEDICAL & SURGICAL HISTORY:  Hemorrhoids      Home Meds: Home Medications:  senna oral tablet: 2 tab(s) orally once a day (at bedtime) (14 May 2021 17:57)    Allergies: Allergies  No Known Allergies  Intolerances    Soc:   Denies Tobacco/EtOH/Substance use  Advanced Directives: Presumed Full Code     ROS:    REVIEW OF SYSTEMS  [x] A ten-point review of systems was otherwise negative except as noted.  [ ] Due to altered mental status/intubation, subjective information were not able to be obtained from the patient. History was obtained, to the extent possible, from review of the chart and collateral sources of information.      CURRENT MEDICATIONS:   --------------------------------------------------------------------------------------  Neurologic Medications  oxycodone    5 mG/acetaminophen 325 mG 1 Tablet(s) Oral every 4 hours PRN Severe Pain (7 - 10)    Respiratory Medications    Cardiovascular Medications    Gastrointestinal Medications  magnesium hydroxide Suspension 30 milliLiter(s) Oral once PRN Constipation  mineral oil Rectal Enema - Peds 1 Enema Rectal Once  senna 2 Tablet(s) Oral Once    Genitourinary Medications    Hematologic/Oncologic Medications    Antimicrobial/Immunologic Medications    Endocrine/Metabolic Medications    Topical/Other Medications    --------------------------------------------------------------------------------------    VITAL SIGNS, INS/OUTS (last 24 hours):  --------------------------------------------------------------------------------------  ICU Vital Signs Last 24 Hrs  T(C): 36.7 (17 May 2021 12:35), Max: 36.7 (17 May 2021 12:35)  T(F): 98 (17 May 2021 12:35), Max: 98 (17 May 2021 12:35)  HR: 97 (17 May 2021 12:35) (97 - 97)  BP: 117/74 (17 May 2021 12:35) (117/74 - 117/74)  BP(mean): --  ABP: --  ABP(mean): --  RR: 18 (17 May 2021 12:35) (18 - 18)  SpO2: 100% (17 May 2021 12:35) (100% - 100%)    I&O's Summary    --------------------------------------------------------------------------------------  PHYSICAL EXAM  General: NAD, AAOx3, calm and cooperative  HEENT: NCAT, SONA, EOMI, Trachea ML, Neck supple  Cardiac: RRR S1, S2, no Murmurs, rubs or gallops  Respiratory: CTAB, normal respiratory effort  Abdomen: Soft, distended, non tender, no rebound or guarding  Rectal: Good tone, tight, no stool in vault, no latoya-anal masses/lesions, no fistulas, fissures, hemorrhoids    --------------------------------------------------------------------------------------  Labs:  CAPILLARY BLOOD GLUCOSE            35y          LFTs:     Male    Coags:            --------------------------------------------------------------------------------------  RADIOLOGY & ADDITIONAL STUDIES:  Imaging reviewed         HPI: 35y Male  HPI:  35yoM w/ pmhx of muscular dystrophy and s/p hemorrhoidectomy, POD3, sent into the ED for constipation. Patient states that he had not had a bowel movement since surgery associated with rectal pain. Patient admits to passing flatus but no BM. He is tolerating a regular diet without nausea or vomiting. He was recently seen in the ED 2 days ago for rectal bleeding which has since resolved. Denies fever chills, abdominal pain, nausea.    PAST MEDICAL & SURGICAL HISTORY:  Hemorrhoids      Home Meds: Home Medications:  senna oral tablet: 2 tab(s) orally once a day (at bedtime) (14 May 2021 17:57)    Allergies: Allergies  No Known Allergies  Intolerances    Soc:   Denies Tobacco/EtOH/Substance use  Advanced Directives: Presumed Full Code     ROS:    REVIEW OF SYSTEMS  [x] A ten-point review of systems was otherwise negative except as noted.  [ ] Due to altered mental status/intubation, subjective information were not able to be obtained from the patient. History was obtained, to the extent possible, from review of the chart and collateral sources of information.      CURRENT MEDICATIONS:   --------------------------------------------------------------------------------------  Neurologic Medications  oxycodone    5 mG/acetaminophen 325 mG 1 Tablet(s) Oral every 4 hours PRN Severe Pain (7 - 10)    Respiratory Medications    Cardiovascular Medications    Gastrointestinal Medications  magnesium hydroxide Suspension 30 milliLiter(s) Oral once PRN Constipation  mineral oil Rectal Enema - Peds 1 Enema Rectal Once  senna 2 Tablet(s) Oral Once    Genitourinary Medications    Hematologic/Oncologic Medications    Antimicrobial/Immunologic Medications    Endocrine/Metabolic Medications    Topical/Other Medications    --------------------------------------------------------------------------------------    VITAL SIGNS, INS/OUTS (last 24 hours):  --------------------------------------------------------------------------------------  ICU Vital Signs Last 24 Hrs  T(C): 36.7 (17 May 2021 12:35), Max: 36.7 (17 May 2021 12:35)  T(F): 98 (17 May 2021 12:35), Max: 98 (17 May 2021 12:35)  HR: 97 (17 May 2021 12:35) (97 - 97)  BP: 117/74 (17 May 2021 12:35) (117/74 - 117/74)  BP(mean): --  ABP: --  ABP(mean): --  RR: 18 (17 May 2021 12:35) (18 - 18)  SpO2: 100% (17 May 2021 12:35) (100% - 100%)    I&O's Summary    --------------------------------------------------------------------------------------  PHYSICAL EXAM  General: NAD, AAOx3, calm and cooperative  HEENT: NCAT, SONA, EOMI, Trachea ML, Neck supple  Cardiac: RRR S1, S2, no Murmurs, rubs or gallops  Respiratory: CTAB, normal respiratory effort  Abdomen: Soft, distended, non tender, no rebound or guarding  Rectal: Good tone, tight, no stool in vault, no latoya-anal masses/lesions, no fistulas, fissures, hemorrhoids    --------------------------------------------------------------------------------------  Labs:  CAPILLARY BLOOD GLUCOSE            --------------------------------------------------------------------------------------  RADIOLOGY & ADDITIONAL STUDIES:  KUB showing large stool burden in colon consistent with constipation, non-obstructive pattern

## 2021-05-17 NOTE — ED PROVIDER NOTE - NS ED ROS FT
Consitutional: No fever, chills, weight loss, generalized fatigue  Eyes:  No visual changes, eye pain or discharge  ENMT:  No hearing changes, pain, discharge or infections; No neck pain, stiffness, or edema  Cardiac:  No chest pain, SOB or edema  Respiratory:  No cough, hemoptysis, or rhinorrhea  GI:  No abdominal pain, nausea, vomiting, diarrhea +CONSTIPATION  :  No dysuria, frequency or burning  MS:  No myalgia, muscle weakness, joint pain or back pain  Neuro:  No headache or weakness.  No LOC.  Skin:  No skin rash, ecchymosis, abrasion, or lesions

## 2021-05-19 LAB — SURGICAL PATHOLOGY STUDY: SIGNIFICANT CHANGE UP

## 2021-05-20 ENCOUNTER — EMERGENCY (EMERGENCY)
Facility: HOSPITAL | Age: 36
LOS: 0 days | Discharge: HOME | End: 2021-05-20
Attending: STUDENT IN AN ORGANIZED HEALTH CARE EDUCATION/TRAINING PROGRAM | Admitting: STUDENT IN AN ORGANIZED HEALTH CARE EDUCATION/TRAINING PROGRAM
Payer: COMMERCIAL

## 2021-05-20 VITALS
WEIGHT: 156.09 LBS | OXYGEN SATURATION: 99 % | HEART RATE: 109 BPM | TEMPERATURE: 98 F | RESPIRATION RATE: 20 BRPM | SYSTOLIC BLOOD PRESSURE: 102 MMHG | DIASTOLIC BLOOD PRESSURE: 62 MMHG

## 2021-05-20 VITALS
OXYGEN SATURATION: 99 % | HEART RATE: 86 BPM | DIASTOLIC BLOOD PRESSURE: 78 MMHG | SYSTOLIC BLOOD PRESSURE: 127 MMHG | RESPIRATION RATE: 20 BRPM

## 2021-05-20 DIAGNOSIS — K64.9 UNSPECIFIED HEMORRHOIDS: ICD-10-CM

## 2021-05-20 DIAGNOSIS — K62.5 HEMORRHAGE OF ANUS AND RECTUM: ICD-10-CM

## 2021-05-20 LAB
ALBUMIN SERPL ELPH-MCNC: 4.2 G/DL — SIGNIFICANT CHANGE UP (ref 3.5–5.2)
ALP SERPL-CCNC: 70 U/L — SIGNIFICANT CHANGE UP (ref 30–115)
ALT FLD-CCNC: 43 U/L — HIGH (ref 0–41)
ANION GAP SERPL CALC-SCNC: 10 MMOL/L — SIGNIFICANT CHANGE UP (ref 7–14)
APTT BLD: 29.7 SEC — SIGNIFICANT CHANGE UP (ref 27–39.2)
AST SERPL-CCNC: 22 U/L — SIGNIFICANT CHANGE UP (ref 0–41)
BASOPHILS # BLD AUTO: 0.03 K/UL — SIGNIFICANT CHANGE UP (ref 0–0.2)
BASOPHILS NFR BLD AUTO: 0.4 % — SIGNIFICANT CHANGE UP (ref 0–1)
BILIRUB SERPL-MCNC: 0.6 MG/DL — SIGNIFICANT CHANGE UP (ref 0.2–1.2)
BLD GP AB SCN SERPL QL: SIGNIFICANT CHANGE UP
BUN SERPL-MCNC: 24 MG/DL — HIGH (ref 10–20)
CALCIUM SERPL-MCNC: 9 MG/DL — SIGNIFICANT CHANGE UP (ref 8.5–10.1)
CHLORIDE SERPL-SCNC: 103 MMOL/L — SIGNIFICANT CHANGE UP (ref 98–110)
CO2 SERPL-SCNC: 26 MMOL/L — SIGNIFICANT CHANGE UP (ref 17–32)
CREAT SERPL-MCNC: 1.1 MG/DL — SIGNIFICANT CHANGE UP (ref 0.7–1.5)
EOSINOPHIL # BLD AUTO: 0.17 K/UL — SIGNIFICANT CHANGE UP (ref 0–0.7)
EOSINOPHIL NFR BLD AUTO: 2.5 % — SIGNIFICANT CHANGE UP (ref 0–8)
GLUCOSE SERPL-MCNC: 104 MG/DL — HIGH (ref 70–99)
HCT VFR BLD CALC: 38.8 % — LOW (ref 42–52)
HGB BLD-MCNC: 13.5 G/DL — LOW (ref 14–18)
IMM GRANULOCYTES NFR BLD AUTO: 0.1 % — SIGNIFICANT CHANGE UP (ref 0.1–0.3)
INR BLD: 1.18 RATIO — SIGNIFICANT CHANGE UP (ref 0.65–1.3)
LYMPHOCYTES # BLD AUTO: 1.6 K/UL — SIGNIFICANT CHANGE UP (ref 1.2–3.4)
LYMPHOCYTES # BLD AUTO: 23.6 % — SIGNIFICANT CHANGE UP (ref 20.5–51.1)
MCHC RBC-ENTMCNC: 29.5 PG — SIGNIFICANT CHANGE UP (ref 27–31)
MCHC RBC-ENTMCNC: 34.8 G/DL — SIGNIFICANT CHANGE UP (ref 32–37)
MCV RBC AUTO: 84.7 FL — SIGNIFICANT CHANGE UP (ref 80–94)
MONOCYTES # BLD AUTO: 0.77 K/UL — HIGH (ref 0.1–0.6)
MONOCYTES NFR BLD AUTO: 11.4 % — HIGH (ref 1.7–9.3)
NEUTROPHILS # BLD AUTO: 4.19 K/UL — SIGNIFICANT CHANGE UP (ref 1.4–6.5)
NEUTROPHILS NFR BLD AUTO: 62 % — SIGNIFICANT CHANGE UP (ref 42.2–75.2)
NRBC # BLD: 0 /100 WBCS — SIGNIFICANT CHANGE UP (ref 0–0)
PLATELET # BLD AUTO: 163 K/UL — SIGNIFICANT CHANGE UP (ref 130–400)
POTASSIUM SERPL-MCNC: 4.3 MMOL/L — SIGNIFICANT CHANGE UP (ref 3.5–5)
POTASSIUM SERPL-SCNC: 4.3 MMOL/L — SIGNIFICANT CHANGE UP (ref 3.5–5)
PROT SERPL-MCNC: 6.7 G/DL — SIGNIFICANT CHANGE UP (ref 6–8)
PROTHROM AB SERPL-ACNC: 13.6 SEC — HIGH (ref 9.95–12.87)
RBC # BLD: 4.58 M/UL — LOW (ref 4.7–6.1)
RBC # FLD: 11.6 % — SIGNIFICANT CHANGE UP (ref 11.5–14.5)
SODIUM SERPL-SCNC: 139 MMOL/L — SIGNIFICANT CHANGE UP (ref 135–146)
WBC # BLD: 6.77 K/UL — SIGNIFICANT CHANGE UP (ref 4.8–10.8)
WBC # FLD AUTO: 6.77 K/UL — SIGNIFICANT CHANGE UP (ref 4.8–10.8)

## 2021-05-20 PROCEDURE — 99284 EMERGENCY DEPT VISIT MOD MDM: CPT

## 2021-05-20 RX ORDER — LIDOCAINE 4 G/100G
1 CREAM TOPICAL
Qty: 1 | Refills: 0
Start: 2021-05-20

## 2021-05-20 RX ORDER — ACETAMINOPHEN 500 MG
650 TABLET ORAL ONCE
Refills: 0 | Status: COMPLETED | OUTPATIENT
Start: 2021-05-20 | End: 2021-05-20

## 2021-05-20 RX ORDER — LIDOCAINE HCL 20 MG/ML
10 VIAL (ML) INJECTION ONCE
Refills: 0 | Status: COMPLETED | OUTPATIENT
Start: 2021-05-20 | End: 2021-05-20

## 2021-05-20 RX ORDER — SODIUM CHLORIDE 9 MG/ML
1000 INJECTION, SOLUTION INTRAVENOUS ONCE
Refills: 0 | Status: COMPLETED | OUTPATIENT
Start: 2021-05-20 | End: 2021-05-20

## 2021-05-20 RX ADMIN — Medication 10 MILLILITER(S): at 08:59

## 2021-05-20 RX ADMIN — Medication 650 MILLIGRAM(S): at 10:09

## 2021-05-20 NOTE — CONSULT NOTE ADULT - ATTENDING COMMENTS
above noted discussed case with surgical resident pt and his wife abdomen soft no active rectal bleeding H/H stable will follow pt in office

## 2021-05-20 NOTE — CONSULT NOTE ADULT - ASSESSMENT
35M w/ PMH of hemorrhoids s/p hemorrhoidectomy 5/14/21 who presents with persistent bloody bowel movements. On exam, no active bleeding noted. Suture line intact. Old blood clots seen in rectum. No bright red blood.    PLAN:   - discussed hemorrhoidectomy details and post-op care: pain and bleeding post-operatively is normal    PAIN MANAGEMENT:   - pain management (pt wants to avoid taking opioids d/t constipation)  - take at least 200mg ibuprofen and 325 tylenol every 6 hours (standing)  - may increase dosage up to 4000mg tylenol and 3200mg ibuprofen total daily    LOCAL CARE:   - continue sitz baths (warm water; with or without epsom salts) as many times a day as needed  - keep area clean and dry  - may use gauze/pad in underwear if spotting  - may use bacitracin, petroleum jelly, or 1% lidocaine ointment as needed to area    AVOID CONSTIPATION:   - discussed importance of not straining: continue miralax daily for at least 2 weeks, even if not having trouble with constipation  - increase dietary fiber and fluid intake      - discussed concerning findings: increased or constant bright red bleeding, feeling lightheaded  - plan d/w pt, pt's wife, and Dr. Javier 35M w/ PMH of hemorrhoids s/p hemorrhoidectomy 5/14/21 who presents with persistent bloody bowel movements. On exam, no active bleeding noted. Suture line intact. Old blood clots seen in rectum. No bright red blood.    PLAN:   - discussed hemorrhoidectomy details and post-op care: pain and bleeding post-operatively is normal    PAIN MANAGEMENT:   - pain management (pt wants to avoid taking opioids d/t constipation)  - take at least 200mg ibuprofen and 325 tylenol every 6 hours (standing)  - may increase dosage up to 4000mg tylenol and 3200mg ibuprofen total daily    LOCAL CARE:   - continue sitz baths (warm water; with or without epsom salts) as many times a day as needed  - keep area clean and dry  - may use gauze/pad in underwear if spotting  - may use bacitracin, petroleum jelly, or lidocaine ointment as needed to area    AVOID CONSTIPATION:   - discussed importance of not straining: continue miralax daily for at least 2 weeks, even if not having trouble with constipation  - increase dietary fiber and fluid intake      - discussed concerning findings: increased or constant bright red bleeding, feeling lightheaded  - plan d/w pt, pt's wife, and Dr. Javier

## 2021-05-20 NOTE — ED PROVIDER NOTE - CLINICAL SUMMARY MEDICAL DECISION MAKING FREE TEXT BOX
35 yr old m that presents due to hemorrhoids. labs, and medications. pt seen and cleared by surgery. pt discharged with surgery follow up and strict return precautions.

## 2021-05-20 NOTE — ED PROVIDER NOTE - PHYSICAL EXAMINATION
CONSTITUTIONAL: in no acute distress.   SKIN: warm, dry  HEAD: Normocephalic.  EYES: PERRL.  ENT: Airway clear.  NECK: Supple.  LYMPH: No acute cervical adenopathy.  CARD: Regular rate and rhythm.   RESP: No wheezing, rales or rhonchi.  ABD: soft ntnd  EXT: No clubbing, cyanosis.   NEURO: Alert, oriented.  PSYCH: Cooperative, appropriate.  RECTAL EXAM: BRBPR in diaper, no site of active bleeding, no ruptured sutures. no discharge

## 2021-05-20 NOTE — ED PROVIDER NOTE - ATTENDING CONTRIBUTION TO CARE
35 yr old m w/ a pmh significant for hemroids who presents with rectal bleeding. Pt had a hemorrhoidectomy on friday and had no complications until today. Pt states that he noted multiple blood clots when going to the bathroom. Pt also complains of rectal pain. Pt denies any LOC, nausea, vomiting, abd pain or any other complaints.     VITAL SIGNS: I have reviewed nursing notes and confirm.  CONSTITUTIONAL: non-toxic, well appearing  SKIN: no rash, no petechiae.  EYES: EOMI, pink conjunctiva, anicteric  ENT: tongue midline, no exudates, MMM  NECK: Supple; no meningismus, no JVD  CARD: RRR, no murmurs, equal radial pulses bilaterally 2+  RESP: CTAB, no respiratory distress  ABD: Soft, non-tender, non-distended, no peritoneal signs, no HSM  : blood noted around the rectum, pt did not tolerate digital exam, no active bleeding noted   EXT: Normal ROM x4. No edema. No calves tenderness  NEURO: Alert, oriented. CN2-12 intact, equal strength bilaterally, nl gait.  PSYCH: Cooperative, appropriate.    a/p  35 yr old m that presents with rectal bleeding  -labs  -ivf  -surgery aware  -dispo pending

## 2021-05-20 NOTE — ED PROVIDER NOTE - PATIENT PORTAL LINK FT
You can access the FollowMyHealth Patient Portal offered by [facility] by registering at the following website: [email or web address]. By joining [facility]’s FollowMyHealth portal, you will also be able to view your health information using other applications (apps) compatible with our system.

## 2021-05-20 NOTE — ED ADULT NURSE NOTE - OBJECTIVE STATEMENT
35 y/ male came in with active rectal bleed, pt had 2 episodes of large clots while having a BM,  pt had hemorrhoid surgery on 5/14, pt has stable and steady gait during ambulation to bathroom.

## 2021-05-20 NOTE — ED ADULT TRIAGE NOTE - CHIEF COMPLAINT QUOTE
Patient states had hemorrhoid removal surgery on friday. +constipation x 4 days. Patient given stool softener and had a bowel movement today. +bleeding noted. denies clots. Patient thinks he may have pulled a suture.

## 2021-05-20 NOTE — ED PROVIDER NOTE - CARE PROVIDER_API CALL
Katia Javier  SURGERY  [address] [place] [address]  Phone: [phone]  Fax: [phone]  Follow Up Time: 1-3 Days

## 2021-05-20 NOTE — CONSULT NOTE ADULT - SUBJECTIVE AND OBJECTIVE BOX
ADAM KIMBROUGH [id number]  35y Male    HPI:  35M w/ PMH of hemorrhoids s/p hemorrhoidectomy 5/14/21 who presents with persistent bloody bowel movements. Pt presented to the ED multiple times post-op: on 5/15 for increased pain and bleeding, and 5/17, where he was seen for constipation, where he was discharged on a bowel regimen of milk of magnesium and enemas. Pt comes into ED today for bloody bowel movements earlier today. Pt reports not taking a bowel regimen for the past few days because he had developed diarrhea. Today he noticed that his bowel movements were full of clots and blood, and came into the ED. Hb 13.5 from 14.7, VS WNL. On exam, no active bleeding noted. Suture line intact. Old blood clots seen in rectum. No bright red blood.    PAST MEDICAL & SURGICAL HISTORY:  Hemorrhoids    Allergies  No Known Allergies    REVIEW OF SYSTEMS  [X] A ten-point review of systems was otherwise negative except as noted.  [ ] Due to altered mental status/intubation, subjective information were not able to be obtained from the patient. History was obtained, to the extent possible, from review of the chart and collateral sources of information.    Vital Signs Last 24 Hrs  T(C): 36.4 (20 May 2021 07:22), Max: 36.4 (20 May 2021 07:22)  T(F): 97.6 (20 May 2021 07:22), Max: 97.6 (20 May 2021 07:22)  HR: 86 (20 May 2021 09:35) (86 - 109)  BP: 127/78 (20 May 2021 09:35) (102/62 - 127/78)  RR: 20 (20 May 2021 09:35) (20 - 20)  SpO2: 99% (20 May 2021 09:35) (99% - 99%)      PHYSICAL EXAM:  GENERAL: NAD  ABDOMEN: Soft, Nontender, Nondistended;   RECTUM: no active bleeding, old clots, visible suture line intact; very tender  EXTREMITIES:  No clubbing, cyanosis, or edema      LABS:                    13.5   6.77  )-----------( 163      ( 20 May 2021 07:40 )             38.8       Auto Neutrophil %: 62.0 % (05-20-21 @ 07:40)  Auto Immature Granulocyte %: 0.1 % (05-20-21 @ 07:40)    05-20    139  |  103  |  24<H>  ----------------------------<  104<H>  4.3   |  26  |  1.1    Calcium, Total Serum: 9.0 mg/dL (05-20-21 @ 07:40)    LFTs:             6.7  | 0.6  | 22       ------------------[70      ( 20 May 2021 07:40 )  4.2  | x    | 43          Coags:     13.60  ----< 1.18    ( 20 May 2021 07:40 )     29.7

## 2021-05-20 NOTE — ED PROVIDER NOTE - OBJECTIVE STATEMENT
35M with past medical history of hemorrhoids s/p hemorrhoidectomy 5/14 with Dr. Javier presents with BRBPR 4-5 episodes since this AM. Denies fever, chills, discharge, abdominal pain, nausea, vomiting, diarrhea. Was previously constipated, but had 5-6 BM yesterday, last BM this AM.

## 2021-05-24 DIAGNOSIS — K64.9 UNSPECIFIED HEMORRHOIDS: ICD-10-CM

## 2021-05-24 DIAGNOSIS — K64.5 PERIANAL VENOUS THROMBOSIS: ICD-10-CM

## 2021-05-24 DIAGNOSIS — K64.8 OTHER HEMORRHOIDS: ICD-10-CM

## 2021-05-27 ENCOUNTER — INPATIENT (INPATIENT)
Facility: HOSPITAL | Age: 36
LOS: 2 days | Discharge: HOME | End: 2021-05-30
Attending: SURGERY | Admitting: SURGERY
Payer: COMMERCIAL

## 2021-05-27 VITALS
HEART RATE: 87 BPM | RESPIRATION RATE: 20 BRPM | SYSTOLIC BLOOD PRESSURE: 110 MMHG | OXYGEN SATURATION: 98 % | TEMPERATURE: 97 F | DIASTOLIC BLOOD PRESSURE: 66 MMHG

## 2021-05-27 LAB
ALBUMIN SERPL ELPH-MCNC: 4.1 G/DL — SIGNIFICANT CHANGE UP (ref 3.5–5.2)
ALP SERPL-CCNC: 65 U/L — SIGNIFICANT CHANGE UP (ref 30–115)
ALT FLD-CCNC: 23 U/L — SIGNIFICANT CHANGE UP (ref 0–41)
ANION GAP SERPL CALC-SCNC: 11 MMOL/L — SIGNIFICANT CHANGE UP (ref 7–14)
AST SERPL-CCNC: 18 U/L — SIGNIFICANT CHANGE UP (ref 0–41)
BASOPHILS # BLD AUTO: 0.04 K/UL — SIGNIFICANT CHANGE UP (ref 0–0.2)
BASOPHILS # BLD AUTO: 0.04 K/UL — SIGNIFICANT CHANGE UP (ref 0–0.2)
BASOPHILS NFR BLD AUTO: 0.3 % — SIGNIFICANT CHANGE UP (ref 0–1)
BASOPHILS NFR BLD AUTO: 0.3 % — SIGNIFICANT CHANGE UP (ref 0–1)
BILIRUB SERPL-MCNC: 0.3 MG/DL — SIGNIFICANT CHANGE UP (ref 0.2–1.2)
BUN SERPL-MCNC: 25 MG/DL — HIGH (ref 10–20)
CALCIUM SERPL-MCNC: 8.9 MG/DL — SIGNIFICANT CHANGE UP (ref 8.5–10.1)
CHLORIDE SERPL-SCNC: 101 MMOL/L — SIGNIFICANT CHANGE UP (ref 98–110)
CO2 SERPL-SCNC: 26 MMOL/L — SIGNIFICANT CHANGE UP (ref 17–32)
CREAT SERPL-MCNC: 1.2 MG/DL — SIGNIFICANT CHANGE UP (ref 0.7–1.5)
EOSINOPHIL # BLD AUTO: 0.02 K/UL — SIGNIFICANT CHANGE UP (ref 0–0.7)
EOSINOPHIL # BLD AUTO: 0.04 K/UL — SIGNIFICANT CHANGE UP (ref 0–0.7)
EOSINOPHIL NFR BLD AUTO: 0.1 % — SIGNIFICANT CHANGE UP (ref 0–8)
EOSINOPHIL NFR BLD AUTO: 0.3 % — SIGNIFICANT CHANGE UP (ref 0–8)
GLUCOSE SERPL-MCNC: 89 MG/DL — SIGNIFICANT CHANGE UP (ref 70–99)
HCT VFR BLD CALC: 28.1 % — LOW (ref 42–52)
HCT VFR BLD CALC: 28.5 % — LOW (ref 42–52)
HGB BLD-MCNC: 9.7 G/DL — LOW (ref 14–18)
HGB BLD-MCNC: 9.7 G/DL — LOW (ref 14–18)
IMM GRANULOCYTES NFR BLD AUTO: 0.6 % — HIGH (ref 0.1–0.3)
IMM GRANULOCYTES NFR BLD AUTO: 0.7 % — HIGH (ref 0.1–0.3)
LYMPHOCYTES # BLD AUTO: 1.27 K/UL — SIGNIFICANT CHANGE UP (ref 1.2–3.4)
LYMPHOCYTES # BLD AUTO: 1.86 K/UL — SIGNIFICANT CHANGE UP (ref 1.2–3.4)
LYMPHOCYTES # BLD AUTO: 12 % — LOW (ref 20.5–51.1)
LYMPHOCYTES # BLD AUTO: 8.3 % — LOW (ref 20.5–51.1)
MCHC RBC-ENTMCNC: 29.4 PG — SIGNIFICANT CHANGE UP (ref 27–31)
MCHC RBC-ENTMCNC: 29.8 PG — SIGNIFICANT CHANGE UP (ref 27–31)
MCHC RBC-ENTMCNC: 34 G/DL — SIGNIFICANT CHANGE UP (ref 32–37)
MCHC RBC-ENTMCNC: 34.5 G/DL — SIGNIFICANT CHANGE UP (ref 32–37)
MCV RBC AUTO: 86.2 FL — SIGNIFICANT CHANGE UP (ref 80–94)
MCV RBC AUTO: 86.4 FL — SIGNIFICANT CHANGE UP (ref 80–94)
MONOCYTES # BLD AUTO: 0.68 K/UL — HIGH (ref 0.1–0.6)
MONOCYTES # BLD AUTO: 0.77 K/UL — HIGH (ref 0.1–0.6)
MONOCYTES NFR BLD AUTO: 4.4 % — SIGNIFICANT CHANGE UP (ref 1.7–9.3)
MONOCYTES NFR BLD AUTO: 5 % — SIGNIFICANT CHANGE UP (ref 1.7–9.3)
NEUTROPHILS # BLD AUTO: 12.78 K/UL — HIGH (ref 1.4–6.5)
NEUTROPHILS # BLD AUTO: 13.07 K/UL — HIGH (ref 1.4–6.5)
NEUTROPHILS NFR BLD AUTO: 82.6 % — HIGH (ref 42.2–75.2)
NEUTROPHILS NFR BLD AUTO: 85.4 % — HIGH (ref 42.2–75.2)
NRBC # BLD: 0 /100 WBCS — SIGNIFICANT CHANGE UP (ref 0–0)
NRBC # BLD: 0 /100 WBCS — SIGNIFICANT CHANGE UP (ref 0–0)
PLATELET # BLD AUTO: 257 K/UL — SIGNIFICANT CHANGE UP (ref 130–400)
PLATELET # BLD AUTO: 283 K/UL — SIGNIFICANT CHANGE UP (ref 130–400)
POTASSIUM SERPL-MCNC: 3.9 MMOL/L — SIGNIFICANT CHANGE UP (ref 3.5–5)
POTASSIUM SERPL-SCNC: 3.9 MMOL/L — SIGNIFICANT CHANGE UP (ref 3.5–5)
PROT SERPL-MCNC: 6.2 G/DL — SIGNIFICANT CHANGE UP (ref 6–8)
RBC # BLD: 3.26 M/UL — LOW (ref 4.7–6.1)
RBC # BLD: 3.3 M/UL — LOW (ref 4.7–6.1)
RBC # FLD: 12.5 % — SIGNIFICANT CHANGE UP (ref 11.5–14.5)
RBC # FLD: 12.6 % — SIGNIFICANT CHANGE UP (ref 11.5–14.5)
SARS-COV-2 RNA SPEC QL NAA+PROBE: SIGNIFICANT CHANGE UP
SODIUM SERPL-SCNC: 138 MMOL/L — SIGNIFICANT CHANGE UP (ref 135–146)
WBC # BLD: 15.29 K/UL — HIGH (ref 4.8–10.8)
WBC # BLD: 15.47 K/UL — HIGH (ref 4.8–10.8)
WBC # FLD AUTO: 15.29 K/UL — HIGH (ref 4.8–10.8)
WBC # FLD AUTO: 15.47 K/UL — HIGH (ref 4.8–10.8)

## 2021-05-27 PROCEDURE — 74174 CTA ABD&PLVS W/CONTRAST: CPT | Mod: 26,MA

## 2021-05-27 PROCEDURE — 99285 EMERGENCY DEPT VISIT HI MDM: CPT

## 2021-05-27 RX ORDER — POLYETHYLENE GLYCOL 3350 17 G/17G
17 POWDER, FOR SOLUTION ORAL DAILY
Refills: 0 | Status: DISCONTINUED | OUTPATIENT
Start: 2021-05-27 | End: 2021-05-30

## 2021-05-27 RX ORDER — SENNA PLUS 8.6 MG/1
2 TABLET ORAL AT BEDTIME
Refills: 0 | Status: DISCONTINUED | OUTPATIENT
Start: 2021-05-27 | End: 2021-05-30

## 2021-05-27 RX ORDER — SODIUM CHLORIDE 9 MG/ML
1000 INJECTION, SOLUTION INTRAVENOUS ONCE
Refills: 0 | Status: COMPLETED | OUTPATIENT
Start: 2021-05-27 | End: 2021-05-27

## 2021-05-27 RX ORDER — ENOXAPARIN SODIUM 100 MG/ML
40 INJECTION SUBCUTANEOUS DAILY
Refills: 0 | Status: DISCONTINUED | OUTPATIENT
Start: 2021-05-27 | End: 2021-05-28

## 2021-05-27 RX ORDER — CHLORHEXIDINE GLUCONATE 213 G/1000ML
1 SOLUTION TOPICAL
Refills: 0 | Status: DISCONTINUED | OUTPATIENT
Start: 2021-05-27 | End: 2021-05-30

## 2021-05-27 RX ORDER — ACETAMINOPHEN 500 MG
650 TABLET ORAL EVERY 6 HOURS
Refills: 0 | Status: DISCONTINUED | OUTPATIENT
Start: 2021-05-27 | End: 2021-05-30

## 2021-05-27 RX ORDER — PANTOPRAZOLE SODIUM 20 MG/1
40 TABLET, DELAYED RELEASE ORAL DAILY
Refills: 0 | Status: DISCONTINUED | OUTPATIENT
Start: 2021-05-27 | End: 2021-05-30

## 2021-05-27 RX ORDER — SODIUM CHLORIDE 9 MG/ML
1000 INJECTION, SOLUTION INTRAVENOUS
Refills: 0 | Status: DISCONTINUED | OUTPATIENT
Start: 2021-05-28 | End: 2021-05-29

## 2021-05-27 RX ADMIN — SENNA PLUS 2 TABLET(S): 8.6 TABLET ORAL at 22:36

## 2021-05-27 RX ADMIN — SODIUM CHLORIDE 1000 MILLILITER(S): 9 INJECTION, SOLUTION INTRAVENOUS at 20:47

## 2021-05-27 NOTE — H&P ADULT - HISTORY OF PRESENT ILLNESS
35M w/ PMH of hemorrhoids s/p hemorrhoidectomy 5/14/21 who presents with persistent bloody bowel movements. Pt presented to the ED multiple times post-op: on 5/15 for increased pain and bleeding, and 5/17 where he was seen for constipation and discharged on a bowel regimen of milk of magnesium and enemas, and 5/20 for bloody bowel movements 2/2 constipation and straining; he was DC'd home on miralax QD. Pt reports since that visit, he has been having numerous but non-bloody bowel movements. He stopped taking the miralax 2 days ago because he said he was having too many bowel movements. He had an episode of straining this morning, and since then having bloody bowel movements. He denies abdominal pain, fever, or chills at home. He endorses fatigue and an episode of dizziness earlier today. He says he has found himself unknowingly passing serosanguinous output/clots. In the ED, HDS. Hb 9.7 from 13.5 and 14.7. Repeat Hb 9.7. CTA w/o active extravasation.

## 2021-05-27 NOTE — ED ADULT NURSE NOTE - CHIEF COMPLAINT QUOTE
c/o bloody stools x 5 days. Patient seen in ED multiple times and sent home, had recent hemorrhoids removed with complications.

## 2021-05-27 NOTE — ED PROVIDER NOTE - OBJECTIVE STATEMENT
35 y.o. male with a PMH of hemorrhoids presented to the ER c/o bloody stool x 1 day.  Pt s/p hemorrhoidectomy 10 days ago.  (+) constipation and hard stool today and started bleeding soon afterward.  Bleeding now stopped but pt came to ER because he feels fatigued.  Denies dizziness, chest pain, SOB, palpitations, diaphoresis, nausea, vomiting.  No other complaints.

## 2021-05-27 NOTE — H&P ADULT - NSICDXPASTMEDICALHX_GEN_ALL_CORE_FT
Pt called stating that he is out of Tramadol. Pt would like to know if he will be prescribed a refill.   PAST MEDICAL HISTORY:  Hemorrhoids

## 2021-05-27 NOTE — ED PROVIDER NOTE - CARE PLAN
Principal Discharge DX:	Hemorrhoids, unspecified hemorrhoid type  Secondary Diagnosis:	Anemia, unspecified type

## 2021-05-27 NOTE — H&P ADULT - ASSESSMENT
35M w/ PMH of hemorrhoids s/p hemorrhoidectomy 5/14/21 who presents with persistent bloody bowel movements after an episode of straining this morning.    PLAN:   - admit to surgery under Dr. Javier  - repeat labs tonight, monitor CBC  - CLD  - NPO at midnight with IVF  - added onto OR tomorrow for exam under anesthesia  - continue bowel regimen  - d/w pt and Dr. Javier

## 2021-05-27 NOTE — ED PROVIDER NOTE - GASTROINTESTINAL, MLM
Abdomen soft, non-tender, no guarding.  Rectal:  (+) sutures, (+) blood noted without any active bleeding, no palpable fissures, pt unable to tolerate exam, limited

## 2021-05-27 NOTE — H&P ADULT - NSHPLABSRESULTS_GEN_ALL_CORE
Labs:  CAPILLARY BLOOD GLUCOSE                        9.7    15.47 )-----------( 283      ( 27 May 2021 14:50 )             28.1       Auto Immature Granulocyte %: 0.6 % (05-27-21 @ 14:50)  Auto Neutrophil %: 82.6 % (05-27-21 @ 14:50)  Auto Neutrophil %: 85.4 % (05-27-21 @ 12:50)  Auto Immature Granulocyte %: 0.7 % (05-27-21 @ 12:50)    05-27    138  |  101  |  25<H>  ----------------------------<  89  3.9   |  26  |  1.2    eGFR if Non African American: 79 mL/min/1.73M2 (05-27-21 @ 14:50)    LFTs:             6.2  | 0.3  | 18       ------------------[65      ( 27 May 2021 14:50 )  4.1  | x    | 23            RADIOLOGY:  < from: CT Angio Abdomen and Pelvis w/ IV Cont (05.27.21 @ 17:16) >    IMPRESSION:  Normal enhancement of the wall of the rectum and anus is noted. There is no evidence of arterial extravasation or pooling of contrast within the lumen of the rectum.  < end of copied text >

## 2021-05-27 NOTE — CONSULT NOTE ADULT - SUBJECTIVE AND OBJECTIVE BOX
ADAM KIMBROUGH [id number]  35y Male    HPI:  35M w/ PMH of hemorrhoids s/p hemorrhoidectomy 5/14/21 who presents with persistent bloody bowel movements. Pt presented to the ED multiple times post-op: on 5/15 for increased pain and bleeding, and 5/17 where he was seen for constipation and discharged on a bowel regimen of milk of magnesium and enemas, and 5/20 for bloody bowel movements 2/2 constipation and straining; he was DC'd home on miralax QD. Pt reports since that visit, he has been having numerous but non-bloody bowel movements. He stopped taking the miralax 2 days ago because he said he was having too many bowel movements. He had an episode of straining this morning, and since then having bloody bowel movements. He denies abdominal pain, fever, or chills at home. He endorses fatigue and an episode of dizziness earlier today. He says he has found himself unknowingly passing serosanguinous output/clots. In the ED, HDS. Hb 9.7 from 13.5 and 14.7.     PAST MEDICAL & SURGICAL HISTORY:  Hemorrhoids    MEDICATIONS  (STANDING):  lactated ringers Bolus 1000 milliLiter(s) IV Bolus once    Allergies  No Known Allergies    REVIEW OF SYSTEMS  [X] A ten-point review of systems was otherwise negative except as noted.  [ ] Due to altered mental status/intubation, subjective information were not able to be obtained from the patient. History was obtained, to the extent possible, from review of the chart and collateral sources of information.    Vital Signs Last 24 Hrs  T(C): 36.1 (27 May 2021 12:02), Max: 36.1 (27 May 2021 12:02)  T(F): 97 (27 May 2021 12:02), Max: 97 (27 May 2021 12:02)  HR: 87 (27 May 2021 12:02) (87 - 87)  BP: 110/66 (27 May 2021 12:02) (110/66 - 110/66)  RR: 20 (27 May 2021 12:02) (20 - 20)  SpO2: 98% (27 May 2021 12:02) (98% - 98%)    PHYSICAL EXAM:  GENERAL: NAD, well-appearing  CHEST/LUNG: Clear to auscultation bilaterally  HEART: Regular rate and rhythm  ABDOMEN: Soft, Nontender, Nondistended;   RECTUM: no active bleeding visualized, some serosanguinous drainage on underwear and in gluteal cleft, dark blood/clots in vault, visible suture line intact  EXTREMITIES: No clubbing, cyanosis, or edema    LABS:                 9.7    15.29 )-----------( 257      ( 27 May 2021 12:50 )             28.5       Auto Neutrophil %: 85.4 % (05-27-21 @ 12:50)  Auto Immature Granulocyte %: 0.7 % (05-27-21 @ 12:50)    RADIOLOGY & ADDITIONAL STUDIES:  pending

## 2021-05-27 NOTE — H&P ADULT - ATTENDING COMMENTS
above noted discussed case with surgical resident rectal exam no active bleeding labs and cta noted will admit for observation and monitor H/H for surgery if bleeding continues

## 2021-05-27 NOTE — ED PROVIDER NOTE - INPATIENT RESIDENT/ACP NOTIFIED
PAST MEDICAL HISTORY:  Diabetes     Hypercholesterolemia     Hypertension     PAD (peripheral artery disease)     Paroxysmal atrial fibrillation      Surgical Resident

## 2021-05-27 NOTE — ED PROVIDER NOTE - ATTENDING CONTRIBUTION TO CARE
34 yo M with PMH of hemorrhoids 10 days post op from dermoidectomy presents to ED for bright red blood per rectum. pt had a hard BM today and afterwards noticed some blood. No clots. Pt came to the ED because he was concerned he lost too much blood. No palpitations, CP, SOb, nausea, vomiting, abdominal pain.     Eyes: PERRL, no conjunctival injection  HENT:  Neck supple without meningismus   CV: RRR, Warm, well-perfused extremities  RESP: CTA B/L, no tachypnea   GI: soft, non-tender, non-distended. Rectal: dried blood around anus, sutures felt. exam limited due to pt discomfort. No active bleeding   MSK: No gross deformities appreciated    Will do CBC to check for anemia 36 yo M with PMH of hemorrhoids hemorrhoidectomy (5/14) presents to ED for bright red blood per rectum. pt had a hard BM today and afterwards noticed some blood. No clots. Pt came to the ED because he was concerned he lost too much blood. No palpitations, CP, SOb, nausea, vomiting, abdominal pain.     Eyes: PERRL, no conjunctival injection  HENT:  Neck supple without meningismus   CV: RRR, Warm, well-perfused extremities  RESP: CTA B/L, no tachypnea   GI: soft, non-tender, non-distended. Rectal: dried blood around anus, sutures felt. exam limited due to pt discomfort. No active bleeding   MSK: No gross deformities appreciated    Will do CBC to check for anemia 34 yo M with PMH of hemorrhoids hemorrhoidectomy (5/14) presents to ED for bright red blood per rectum. pt had a hard BM today and afterwards noticed some blood. + clots. Pt came to the ED because he was concerned he lost too much blood. No palpitations, CP, SOb, nausea, vomiting, abdominal pain.     Eyes: PERRL, no conjunctival injection  HENT:  Neck supple without meningismus   CV: RRR, Warm, well-perfused extremities  RESP: CTA B/L, no tachypnea   GI: soft, non-tender, non-distended. Rectal: dried blood around anus, sutures felt. exam limited due to pt discomfort. No active bleeding   MSK: No gross deformities appreciated    Will do CBC to check for anemia

## 2021-05-27 NOTE — H&P ADULT - NSHPPHYSICALEXAM_GEN_ALL_CORE
GENERAL: NAD, well-appearing  CHEST/LUNG: Clear to auscultation bilaterally  HEART: Regular rate and rhythm  ABDOMEN: Soft, Nontender, Nondistended;   RECTUM: no active bleeding visualized, some serosanguinous drainage on underwear and in gluteal cleft, dark blood/clots in vault, visible suture line intact  EXTREMITIES: No clubbing, cyanosis, or edema

## 2021-05-27 NOTE — CONSULT NOTE ADULT - ASSESSMENT
35M w/ PMH of hemorrhoids s/p hemorrhoidectomy 5/14/21 who presents with persistent bloody bowel movements after an episode of straining this morning.    PLAN:   - please repeat CBC  - CTA A/P to eval for active bleeding  - d/w Dr. Javier

## 2021-05-27 NOTE — ED PROVIDER NOTE - PROGRESS NOTE DETAILS
Pt had 13.5 to 9.7 hgb drop  Surgery consulted spoke to surgery, consulting on case spoke to sx, order CTA abdomen and pelvis and new labs

## 2021-05-27 NOTE — ED PROVIDER NOTE - CLINICAL SUMMARY MEDICAL DECISION MAKING FREE TEXT BOX
36 yo M presented to ED for bright red blood per rectum in setting for recent hemorrhoidectomy. Pt had CTA which did not demonstrate any active bleeding. Pt had hgb drop from 13.5 to 9.7. Pt admitted to surgery for further management.

## 2021-05-28 LAB
ANION GAP SERPL CALC-SCNC: 11 MMOL/L — SIGNIFICANT CHANGE UP (ref 7–14)
ANION GAP SERPL CALC-SCNC: 7 MMOL/L — SIGNIFICANT CHANGE UP (ref 7–14)
APPEARANCE UR: CLEAR — SIGNIFICANT CHANGE UP
APTT BLD: 28.5 SEC — SIGNIFICANT CHANGE UP (ref 27–39.2)
BASOPHILS # BLD AUTO: 0.03 K/UL — SIGNIFICANT CHANGE UP (ref 0–0.2)
BASOPHILS NFR BLD AUTO: 0.5 % — SIGNIFICANT CHANGE UP (ref 0–1)
BILIRUB UR-MCNC: NEGATIVE — SIGNIFICANT CHANGE UP
BLD GP AB SCN SERPL QL: SIGNIFICANT CHANGE UP
BUN SERPL-MCNC: 11 MG/DL — SIGNIFICANT CHANGE UP (ref 10–20)
BUN SERPL-MCNC: 17 MG/DL — SIGNIFICANT CHANGE UP (ref 10–20)
CALCIUM SERPL-MCNC: 8.8 MG/DL — SIGNIFICANT CHANGE UP (ref 8.5–10.1)
CALCIUM SERPL-MCNC: 8.9 MG/DL — SIGNIFICANT CHANGE UP (ref 8.5–10.1)
CHLORIDE SERPL-SCNC: 104 MMOL/L — SIGNIFICANT CHANGE UP (ref 98–110)
CHLORIDE SERPL-SCNC: 105 MMOL/L — SIGNIFICANT CHANGE UP (ref 98–110)
CO2 SERPL-SCNC: 26 MMOL/L — SIGNIFICANT CHANGE UP (ref 17–32)
CO2 SERPL-SCNC: 27 MMOL/L — SIGNIFICANT CHANGE UP (ref 17–32)
COLOR SPEC: COLORLESS — SIGNIFICANT CHANGE UP
COVID-19 SPIKE DOMAIN AB INTERP: POSITIVE
COVID-19 SPIKE DOMAIN ANTIBODY RESULT: >250 U/ML — HIGH
CREAT SERPL-MCNC: 1 MG/DL — SIGNIFICANT CHANGE UP (ref 0.7–1.5)
CREAT SERPL-MCNC: 1.1 MG/DL — SIGNIFICANT CHANGE UP (ref 0.7–1.5)
DIFF PNL FLD: NEGATIVE — SIGNIFICANT CHANGE UP
EOSINOPHIL # BLD AUTO: 0.08 K/UL — SIGNIFICANT CHANGE UP (ref 0–0.7)
EOSINOPHIL NFR BLD AUTO: 1.3 % — SIGNIFICANT CHANGE UP (ref 0–8)
GLUCOSE SERPL-MCNC: 81 MG/DL — SIGNIFICANT CHANGE UP (ref 70–99)
GLUCOSE SERPL-MCNC: 92 MG/DL — SIGNIFICANT CHANGE UP (ref 70–99)
GLUCOSE UR QL: NEGATIVE — SIGNIFICANT CHANGE UP
HCT VFR BLD CALC: 24.3 % — LOW (ref 42–52)
HCT VFR BLD CALC: 25.2 % — LOW (ref 42–52)
HCT VFR BLD CALC: 26 % — LOW (ref 42–52)
HGB BLD-MCNC: 8.5 G/DL — LOW (ref 14–18)
HGB BLD-MCNC: 8.7 G/DL — LOW (ref 14–18)
HGB BLD-MCNC: 8.7 G/DL — LOW (ref 14–18)
IMM GRANULOCYTES NFR BLD AUTO: 0.5 % — HIGH (ref 0.1–0.3)
INR BLD: 1.18 RATIO — SIGNIFICANT CHANGE UP (ref 0.65–1.3)
KETONES UR-MCNC: ABNORMAL
LEUKOCYTE ESTERASE UR-ACNC: NEGATIVE — SIGNIFICANT CHANGE UP
LYMPHOCYTES # BLD AUTO: 2.03 K/UL — SIGNIFICANT CHANGE UP (ref 1.2–3.4)
LYMPHOCYTES # BLD AUTO: 32.6 % — SIGNIFICANT CHANGE UP (ref 20.5–51.1)
MAGNESIUM SERPL-MCNC: 1.9 MG/DL — SIGNIFICANT CHANGE UP (ref 1.8–2.4)
MAGNESIUM SERPL-MCNC: 2 MG/DL — SIGNIFICANT CHANGE UP (ref 1.8–2.4)
MCHC RBC-ENTMCNC: 28.7 PG — SIGNIFICANT CHANGE UP (ref 27–31)
MCHC RBC-ENTMCNC: 29.1 PG — SIGNIFICANT CHANGE UP (ref 27–31)
MCHC RBC-ENTMCNC: 29.9 PG — SIGNIFICANT CHANGE UP (ref 27–31)
MCHC RBC-ENTMCNC: 33.5 G/DL — SIGNIFICANT CHANGE UP (ref 32–37)
MCHC RBC-ENTMCNC: 34.5 G/DL — SIGNIFICANT CHANGE UP (ref 32–37)
MCHC RBC-ENTMCNC: 35 G/DL — SIGNIFICANT CHANGE UP (ref 32–37)
MCV RBC AUTO: 84.3 FL — SIGNIFICANT CHANGE UP (ref 80–94)
MCV RBC AUTO: 85.6 FL — SIGNIFICANT CHANGE UP (ref 80–94)
MCV RBC AUTO: 85.8 FL — SIGNIFICANT CHANGE UP (ref 80–94)
MONOCYTES # BLD AUTO: 0.44 K/UL — SIGNIFICANT CHANGE UP (ref 0.1–0.6)
MONOCYTES NFR BLD AUTO: 7.1 % — SIGNIFICANT CHANGE UP (ref 1.7–9.3)
NEUTROPHILS # BLD AUTO: 3.61 K/UL — SIGNIFICANT CHANGE UP (ref 1.4–6.5)
NEUTROPHILS NFR BLD AUTO: 58 % — SIGNIFICANT CHANGE UP (ref 42.2–75.2)
NITRITE UR-MCNC: NEGATIVE — SIGNIFICANT CHANGE UP
NRBC # BLD: 0 /100 WBCS — SIGNIFICANT CHANGE UP (ref 0–0)
PH UR: 7.5 — SIGNIFICANT CHANGE UP (ref 5–8)
PHOSPHATE SERPL-MCNC: 3.3 MG/DL — SIGNIFICANT CHANGE UP (ref 2.1–4.9)
PHOSPHATE SERPL-MCNC: 4.2 MG/DL — SIGNIFICANT CHANGE UP (ref 2.1–4.9)
PLATELET # BLD AUTO: 212 K/UL — SIGNIFICANT CHANGE UP (ref 130–400)
PLATELET # BLD AUTO: 213 K/UL — SIGNIFICANT CHANGE UP (ref 130–400)
PLATELET # BLD AUTO: 216 K/UL — SIGNIFICANT CHANGE UP (ref 130–400)
POTASSIUM SERPL-MCNC: 4 MMOL/L — SIGNIFICANT CHANGE UP (ref 3.5–5)
POTASSIUM SERPL-MCNC: 4 MMOL/L — SIGNIFICANT CHANGE UP (ref 3.5–5)
POTASSIUM SERPL-SCNC: 4 MMOL/L — SIGNIFICANT CHANGE UP (ref 3.5–5)
POTASSIUM SERPL-SCNC: 4 MMOL/L — SIGNIFICANT CHANGE UP (ref 3.5–5)
PROT UR-MCNC: NEGATIVE — SIGNIFICANT CHANGE UP
PROTHROM AB SERPL-ACNC: 13.6 SEC — HIGH (ref 9.95–12.87)
RBC # BLD: 2.84 M/UL — LOW (ref 4.7–6.1)
RBC # BLD: 2.99 M/UL — LOW (ref 4.7–6.1)
RBC # BLD: 3.03 M/UL — LOW (ref 4.7–6.1)
RBC # FLD: 12.6 % — SIGNIFICANT CHANGE UP (ref 11.5–14.5)
RBC # FLD: 12.7 % — SIGNIFICANT CHANGE UP (ref 11.5–14.5)
RBC # FLD: 12.7 % — SIGNIFICANT CHANGE UP (ref 11.5–14.5)
SARS-COV-2 IGG+IGM SERPL QL IA: >250 U/ML — HIGH
SARS-COV-2 IGG+IGM SERPL QL IA: POSITIVE
SODIUM SERPL-SCNC: 139 MMOL/L — SIGNIFICANT CHANGE UP (ref 135–146)
SODIUM SERPL-SCNC: 141 MMOL/L — SIGNIFICANT CHANGE UP (ref 135–146)
SP GR SPEC: 1.01 — SIGNIFICANT CHANGE UP (ref 1.01–1.03)
UROBILINOGEN FLD QL: SIGNIFICANT CHANGE UP
WBC # BLD: 5.87 K/UL — SIGNIFICANT CHANGE UP (ref 4.8–10.8)
WBC # BLD: 6.05 K/UL — SIGNIFICANT CHANGE UP (ref 4.8–10.8)
WBC # BLD: 6.22 K/UL — SIGNIFICANT CHANGE UP (ref 4.8–10.8)
WBC # FLD AUTO: 5.87 K/UL — SIGNIFICANT CHANGE UP (ref 4.8–10.8)
WBC # FLD AUTO: 6.05 K/UL — SIGNIFICANT CHANGE UP (ref 4.8–10.8)
WBC # FLD AUTO: 6.22 K/UL — SIGNIFICANT CHANGE UP (ref 4.8–10.8)

## 2021-05-28 PROCEDURE — 99223 1ST HOSP IP/OBS HIGH 75: CPT

## 2021-05-28 RX ORDER — HEPARIN SODIUM 5000 [USP'U]/ML
5000 INJECTION INTRAVENOUS; SUBCUTANEOUS EVERY 8 HOURS
Refills: 0 | Status: DISCONTINUED | OUTPATIENT
Start: 2021-05-28 | End: 2021-05-30

## 2021-05-28 RX ORDER — SOD SULF/SODIUM/NAHCO3/KCL/PEG
4000 SOLUTION, RECONSTITUTED, ORAL ORAL ONCE
Refills: 0 | Status: COMPLETED | OUTPATIENT
Start: 2021-05-28 | End: 2021-05-28

## 2021-05-28 RX ORDER — MORPHINE SULFATE 50 MG/1
4 CAPSULE, EXTENDED RELEASE ORAL ONCE
Refills: 0 | Status: DISCONTINUED | OUTPATIENT
Start: 2021-05-28 | End: 2021-05-28

## 2021-05-28 RX ORDER — MORPHINE SULFATE 50 MG/1
2 CAPSULE, EXTENDED RELEASE ORAL ONCE
Refills: 0 | Status: DISCONTINUED | OUTPATIENT
Start: 2021-05-28 | End: 2021-05-28

## 2021-05-28 RX ORDER — SODIUM CHLORIDE 9 MG/ML
1000 INJECTION, SOLUTION INTRAVENOUS ONCE
Refills: 0 | Status: COMPLETED | OUTPATIENT
Start: 2021-05-28 | End: 2021-05-28

## 2021-05-28 RX ADMIN — SODIUM CHLORIDE 100 MILLILITER(S): 9 INJECTION, SOLUTION INTRAVENOUS at 21:06

## 2021-05-28 RX ADMIN — SODIUM CHLORIDE 1000 MILLILITER(S): 9 INJECTION, SOLUTION INTRAVENOUS at 05:29

## 2021-05-28 RX ADMIN — MORPHINE SULFATE 2 MILLIGRAM(S): 50 CAPSULE, EXTENDED RELEASE ORAL at 14:24

## 2021-05-28 RX ADMIN — HEPARIN SODIUM 5000 UNIT(S): 5000 INJECTION INTRAVENOUS; SUBCUTANEOUS at 21:13

## 2021-05-28 RX ADMIN — Medication 0.5 MILLIGRAM(S): at 10:14

## 2021-05-28 RX ADMIN — POLYETHYLENE GLYCOL 3350 17 GRAM(S): 17 POWDER, FOR SOLUTION ORAL at 11:18

## 2021-05-28 RX ADMIN — MORPHINE SULFATE 2 MILLIGRAM(S): 50 CAPSULE, EXTENDED RELEASE ORAL at 10:26

## 2021-05-28 RX ADMIN — SODIUM CHLORIDE 100 MILLILITER(S): 9 INJECTION, SOLUTION INTRAVENOUS at 10:29

## 2021-05-28 RX ADMIN — CHLORHEXIDINE GLUCONATE 1 APPLICATION(S): 213 SOLUTION TOPICAL at 05:16

## 2021-05-28 RX ADMIN — PANTOPRAZOLE SODIUM 40 MILLIGRAM(S): 20 TABLET, DELAYED RELEASE ORAL at 11:18

## 2021-05-28 RX ADMIN — SENNA PLUS 2 TABLET(S): 8.6 TABLET ORAL at 21:14

## 2021-05-28 RX ADMIN — Medication 4000 MILLILITER(S): at 13:39

## 2021-05-28 NOTE — CHART NOTE - NSCHARTNOTEFT_GEN_A_CORE
PACU ANESTHESIA ADMISSION NOTE      Procedure: flex sigmoidoscopy  Post op diagnosis:  rectal bleeding    ____  Intubated  TV:______       Rate: ______      FiO2: ______    __x__  Patent Airway    __x__  Full return of protective reflexes    __x__  Full recovery from anesthesia / back to baseline status    Vitals:  T(C): 36.7 (05-28-21 @ 18:34), Max: 36.9 (05-28-21 @ 17:05)  HR: 81 (05-28-21 @ 18:34) (74 - 94)  BP: 115/69 (05-28-21 @ 18:34) (91/53 - 122/66)  RR: 18 (05-28-21 @ 18:34) (18 - 18)  SpO2: 99% (05-28-21 @ 03:18) (99% - 99%)    Mental Status:  __x__ Awake   ___x__ Alert   _____ Drowsy   _____ Sedated    Nausea/Vomiting:  __x__ NO  ______Yes,   See Post - Op Orders          Pain Scale (0-10):  __0___    Treatment: ____ None    __x__ See Post - Op/PCA Orders    Post - Operative Fluids:   ____ Oral   __x__ See Post - Op Orders    Plan: Discharge:   ____Home       ___x__Floor     _____Critical Care    _____  Other:_________________    Comments: Patient had smooth intraoperative event, no anesthesia complication.  PACU Vital signs: HR:      80      BP:    167    /    16      RR:    18         O2 Sat:   100    %     Temp 98.5
Patient underwent flexible sigmoidoscopy.   Scope was traversed until 45cm from anal verge. No blood was noted in rectum and sigmoid colon. .    Minimal oozing was noted at the site of recent hemorrhoidectomy and sutures. .       Plan:   Monitor CBC  Avoid constipation   follow up wit surgery

## 2021-05-28 NOTE — PROGRESS NOTE ADULT - ATTENDING COMMENTS
above noted discussed case with surgical resident abdomen soft no distension rectal no active bleeding discussed case with DR DELCID regarding colonoscopy

## 2021-05-28 NOTE — PROVIDER CONTACT NOTE (OTHER) - ACTION/TREATMENT ORDERED:
Have patient ambulate to bathroom, no further intervention
PA will assess patient at the bedside
PA spoke to patient at bedside, will have GI come speak to patient

## 2021-05-28 NOTE — CONSULT NOTE ADULT - SUBJECTIVE AND OBJECTIVE BOX
Gastroenterology Consultation:    Admitted on: 05-27-21  HPI: 35M w/ PMH of hemorrhoids s/p hemorrhoidectomy 5/14/21 who presents with persistent bloody bowel movements. Pt presented to the ED multiple times post-op: on 5/15 for increased pain and bleeding, and 5/17 where he was seen for constipation and discharged on a bowel regimen of milk of magnesium and enemas, and 5/20 for bloody bowel movements 2/2 constipation and straining; he was DC'd home on miralax QD. Pt reports since that visit, he has been having numerous but non-bloody bowel movements. He stopped taking the miralax 2 days ago because he said he was having too many bowel movements. He had an episode of straining this morning, and since then having bloody bowel movements. He denies abdominal pain, fever, or chills at home. He endorses fatigue and an episode of dizziness earlier today. He says he has found himself unknowingly passing serosanguinous output/clots. In the ED, HDS. Hb 9.7 from 13.5 and 14.7. Repeat Hb 9.7. CTA w/o active extravasation. (27 May 2021 18:45)    GI team being consulted for bleeding post hemorrhoidectomy 2 weeks ago. JOSE positive for red blood. Patient in extreme pain and not very compliant with tap water enemas. CTA was negative for extravasation     Prior records Reviewed (Y/N): N  History obtained from person other than patient (Y/N): N    Prior EGD: N  Prior Colonoscopy: N    PAST MEDICAL & SURGICAL HISTORY:  Hemorrhoids    FAMILY HISTORY:    Social History:  Tobacco:  Alcohol:  Drugs:    Home Medications:    MEDICATIONS  (STANDING):  chlorhexidine 4% Liquid 1 Application(s) Topical <User Schedule>  heparin   Injectable 5000 Unit(s) SubCutaneous every 8 hours  lactated ringers. 1000 milliLiter(s) (100 mL/Hr) IV Continuous <Continuous>  morphine  - Injectable 4 milliGRAM(s) IV Push once  pantoprazole  Injectable 40 milliGRAM(s) IV Push daily  polyethylene glycol 3350 17 Gram(s) Oral daily  senna 2 Tablet(s) Oral at bedtime    MEDICATIONS  (PRN):  acetaminophen   Tablet .. 650 milliGRAM(s) Oral every 6 hours PRN Mild Pain (1 - 3)    Allergies  No Known Allergies      Review of Systems:   Constitutional:  No Fever, No Chills  Cardiovascular:  No Chest Pain, No Palpitations  Respiratory:  No Cough, No Dyspnea  Gastrointestinal:  As described in HPI. severe rectal pain   Musculoskeletal:  No Joint Swelling, No Back Pain  Skin:  No Skin Lesions, No Jaundice  Neuro:  No Syncope, No Dizziness  Heme/Lymph:  bleeding per rectum       Physical Examination:  T(C): 36.6 (05-28-21 @ 09:13), Max: 36.8 (05-27-21 @ 23:55)  HR: 77 (05-28-21 @ 09:13) (74 - 94)  BP: 103/59 (05-28-21 @ 09:13) (91/53 - 105/56)  RR: 18 (05-28-21 @ 09:13) (18 - 18)  SpO2: 99% (05-28-21 @ 03:18) (99% - 99%)      05-27-21 @ 07:01  -  05-28-21 @ 07:00  --------------------------------------------------------  IN: 1500 mL / OUT: 0 mL / NET: 1500 mL      Constitutional: No acute distress.  Eyes:. Conjunctivae are clear, Sclera is non-icteric.  Ears Nose and Throat: The external ears are normal appearing,  Oral mucosa is pink and moist.  Respiratory:  No signs of respiratory distress. Cardiovascular, Regular rate and rhythm.  GI: Abdomen is soft, symmetric, and non-tender without distention. There are no visible lesions. Bowel sounds are present and normoactive in all four quadrants. JOSE+ blood   Neuro: No Tremor, No involuntary movements  Skin: No rashes, No Jaundice.    Data: (reviewed by attending)                        8.7    5.87  )-----------( 216      ( 28 May 2021 11:38 )             25.2     Hgb Trend:  8.7  05-28-21 @ 11:38  8.5  05-28-21 @ 04:05  9.7  05-27-21 @ 14:50  9.7  05-27-21 @ 12:50        05-28    139  |  105  |  17  ----------------------------<  92  4.0   |  27  |  1.1    Ca    8.8      28 May 2021 04:05  Phos  4.2     05-28  Mg     2.0     05-28    TPro  6.2  /  Alb  4.1  /  TBili  0.3  /  DBili  x   /  AST  18  /  ALT  23  /  AlkPhos  65  05-27    Liver panel trend:  TBili 0.3   /   AST 18   /   ALT 23   /   AlkP 65   /   Tptn 6.2   /   Alb 4.1    /   DBili --      05-27  TBili 0.6   /   AST 22   /   ALT 43   /   AlkP 70   /   Tptn 6.7   /   Alb 4.2    /   DBili --      05-20      PT/INR - ( 28 May 2021 04:05 )   PT: 13.60 sec;   INR: 1.18 ratio         PTT - ( 28 May 2021 04:05 )  PTT:28.5 sec  Radiology:(reviewed by attending)    CT Angio Abdomen and Pelvis w/ IV Cont:   EXAM:  CT ANGIO ABD PELV (W)AW IC        PROCEDURE DATE:  05/27/2021    INTERPRETATION:  REASON FOR EXAM / CLINICAL STATEMENT: Bloody stools. S/Phemorrhoidectomy 10 days ago.   Evaluate for active bleeding  TECHNIQUE: Contiguous axialCT images were obtained from the lung bases through the pelvis prior to, and with intravenous contrast in the arterial and venous phases.  Reformatted images in the coronal and sagittal planes were acquired, as well as 3DMIP reconstructed images.  COMPARISON CT: None.  OTHER STUDIES USED FOR CORRELATION: None.  FINDINGS:  LOWER CHEST: The lung bases are clear.No pleural or pericardial effusion.  HEPATIC: The liver is normal in appearance with no evidence of mass or bile duct dilatation. A subcentimeter hypodensity too small to fully evaluate is noted in the right lobe of liver. The portal vein is patent. The hepatic veins are opacified.  BILIARY: No calcified gallstones are noted.  SPLEEN: Unremarkable.  PANCREAS: The pancreas is normal in size and configuration. No evidence of mass or pancreatitis.  ADRENAL GLANDS: Unremarkable.  KIDNEYS: No evidence of hydronephrosis, calcified stones, or solid renal mass.  ABDOMINOPELVIC NODES: Unremarkable.  PELVIC ORGANS: Noevidence of pelvic mass, lymphadenopathy, or fluid collection.  PERITONEUM/MESENTERY/BOWEL: Normal enhancement of the wall of the rectum and anus is noted. There is no evidence of arterial extravasation or pooling of contrast within the lumen of the rectum.  No evidence of obstruction, colitis, inflammatory process, or ascites within the abdomen or pelvis. The appendix is normal in appearance.  BONES/SOFT TISSUES: Unremarkable.  VASCULAR: Normal caliber aorta. The celiac axis, the superior mesenteric artery, the inferior mesenteric artery, and the renal arteries are patent. There is a replaced right hepatic artery to the superior mesenteric artery. Single renal arteries are seen bilaterally. There is a retroaortic left renal vein  IMPRESSION:  Normal enhancement of the wall of the rectum and anus is noted. There is no evidence of arterial extravasation or pooling of contrast within the lumen of the rectum.  JOSE LUIS GHOTRA MD; Attending Interventional Radiologist  This document has been electronically signed. May 27 2021  6:03PM (05-27-21 @ 17:16)       Gastroenterology Consultation:    Admitted on: 05-27-21  HPI: 35M w/ PMH of hemorrhoids s/p hemorrhoidectomy 5/14/21 who presents with persistent bloody bowel movements. Pt presented to the ED multiple times post-op: on 5/15 for increased pain and bleeding, and 5/17 where he was seen for constipation and discharged on a bowel regimen of milk of magnesium and enemas, and 5/20 for bloody bowel movements 2/2 constipation and straining; he was DC'd home on miralax QD. Pt reports since that visit, he has been having numerous but non-bloody bowel movements. He stopped taking the miralax 2 days ago because he said he was having too many bowel movements. He had an episode of straining this morning, and since then having bloody bowel movements. He denies abdominal pain, fever, or chills at home. He endorses fatigue and an episode of dizziness earlier today. He says he has found himself unknowingly passing serosanguinous output/clots. In the ED, HDS. Hb 9.7 from 13.5 and 14.7. Repeat Hb 9.7. CTA w/o active extravasation. (27 May 2021 18:45)    GI team being consulted for bleeding post hemorrhoidectomy 2 weeks ago. JOSE positive for red blood. Patient in extreme pain and not very compliant with tap water enemas. CTA was negative for extravasation     Prior records Reviewed (Y/N): N  History obtained from person other than patient (Y/N): N    Prior EGD: N  Prior Colonoscopy: N    PAST MEDICAL & SURGICAL HISTORY:  Hemorrhoids    FAMILY HISTORY: denies FH ca    Social History:  Tobacco: denies  Alcohol: denies  Drugs: denies     Home Medications:    MEDICATIONS  (STANDING):  chlorhexidine 4% Liquid 1 Application(s) Topical <User Schedule>  heparin   Injectable 5000 Unit(s) SubCutaneous every 8 hours  lactated ringers. 1000 milliLiter(s) (100 mL/Hr) IV Continuous <Continuous>  morphine  - Injectable 4 milliGRAM(s) IV Push once  pantoprazole  Injectable 40 milliGRAM(s) IV Push daily  polyethylene glycol 3350 17 Gram(s) Oral daily  senna 2 Tablet(s) Oral at bedtime    MEDICATIONS  (PRN):  acetaminophen   Tablet .. 650 milliGRAM(s) Oral every 6 hours PRN Mild Pain (1 - 3)    Allergies  No Known Allergies      Review of Systems:   Constitutional:  No Fever, No Chills  Cardiovascular:  No Chest Pain, No Palpitations  Respiratory:  No Cough, No Dyspnea  Gastrointestinal:  As described in HPI. severe rectal pain   Musculoskeletal:  No Joint Swelling, No Back Pain  Skin:  No Skin Lesions, No Jaundice  Neuro:  + preSyncope, +Dizziness  Heme/Lymph:  bleeding per rectum       Physical Examination:  T(C): 36.6 (05-28-21 @ 09:13), Max: 36.8 (05-27-21 @ 23:55)  HR: 77 (05-28-21 @ 09:13) (74 - 94)  BP: 103/59 (05-28-21 @ 09:13) (91/53 - 105/56)  RR: 18 (05-28-21 @ 09:13) (18 - 18)  SpO2: 99% (05-28-21 @ 03:18) (99% - 99%)      05-27-21 @ 07:01  -  05-28-21 @ 07:00  --------------------------------------------------------  IN: 1500 mL / OUT: 0 mL / NET: 1500 mL      Constitutional: No acute distress.  Eyes:. Conjunctivae are clear, Sclera is non-icteric.  Ears Nose and Throat: The external ears are normal appearing,  Oral mucosa is pink and moist.  Respiratory:  No signs of respiratory distress. Cardiovascular, Regular rate and rhythm.  GI: Abdomen is soft, symmetric, and non-tender without distention. There are no visible lesions. Bowel sounds are present and normoactive in all four quadrants. JOSE+ blood   Neuro: No Tremor, No involuntary movements  Skin: No rashes, No Jaundice.    Data: (reviewed by attending)                        8.7    5.87  )-----------( 216      ( 28 May 2021 11:38 )             25.2     Hgb Trend:  8.7  05-28-21 @ 11:38  8.5  05-28-21 @ 04:05  9.7  05-27-21 @ 14:50  9.7  05-27-21 @ 12:50        05-28    139  |  105  |  17  ----------------------------<  92  4.0   |  27  |  1.1    Ca    8.8      28 May 2021 04:05  Phos  4.2     05-28  Mg     2.0     05-28    TPro  6.2  /  Alb  4.1  /  TBili  0.3  /  DBili  x   /  AST  18  /  ALT  23  /  AlkPhos  65  05-27    Liver panel trend:  TBili 0.3   /   AST 18   /   ALT 23   /   AlkP 65   /   Tptn 6.2   /   Alb 4.1    /   DBili --      05-27  TBili 0.6   /   AST 22   /   ALT 43   /   AlkP 70   /   Tptn 6.7   /   Alb 4.2    /   DBili --      05-20      PT/INR - ( 28 May 2021 04:05 )   PT: 13.60 sec;   INR: 1.18 ratio         PTT - ( 28 May 2021 04:05 )  PTT:28.5 sec  Radiology:(reviewed by attending)    CT Angio Abdomen and Pelvis w/ IV Cont:   EXAM:  CT ANGIO ABD PELV (W)AW IC        PROCEDURE DATE:  05/27/2021    INTERPRETATION:  REASON FOR EXAM / CLINICAL STATEMENT: Bloody stools. S/Phemorrhoidectomy 10 days ago.   Evaluate for active bleeding  TECHNIQUE: Contiguous axialCT images were obtained from the lung bases through the pelvis prior to, and with intravenous contrast in the arterial and venous phases.  Reformatted images in the coronal and sagittal planes were acquired, as well as 3DMIP reconstructed images.  COMPARISON CT: None.  OTHER STUDIES USED FOR CORRELATION: None.  FINDINGS:  LOWER CHEST: The lung bases are clear.No pleural or pericardial effusion.  HEPATIC: The liver is normal in appearance with no evidence of mass or bile duct dilatation. A subcentimeter hypodensity too small to fully evaluate is noted in the right lobe of liver. The portal vein is patent. The hepatic veins are opacified.  BILIARY: No calcified gallstones are noted.  SPLEEN: Unremarkable.  PANCREAS: The pancreas is normal in size and configuration. No evidence of mass or pancreatitis.  ADRENAL GLANDS: Unremarkable.  KIDNEYS: No evidence of hydronephrosis, calcified stones, or solid renal mass.  ABDOMINOPELVIC NODES: Unremarkable.  PELVIC ORGANS: Noevidence of pelvic mass, lymphadenopathy, or fluid collection.  PERITONEUM/MESENTERY/BOWEL: Normal enhancement of the wall of the rectum and anus is noted. There is no evidence of arterial extravasation or pooling of contrast within the lumen of the rectum.  No evidence of obstruction, colitis, inflammatory process, or ascites within the abdomen or pelvis. The appendix is normal in appearance.  BONES/SOFT TISSUES: Unremarkable.  VASCULAR: Normal caliber aorta. The celiac axis, the superior mesenteric artery, the inferior mesenteric artery, and the renal arteries are patent. There is a replaced right hepatic artery to the superior mesenteric artery. Single renal arteries are seen bilaterally. There is a retroaortic left renal vein  IMPRESSION:  Normal enhancement of the wall of the rectum and anus is noted. There is no evidence of arterial extravasation or pooling of contrast within the lumen of the rectum.  JOSE LUIS GHOTRA MD; Attending Interventional Radiologist  This document has been electronically signed. May 27 2021  6:03PM (05-27-21 @ 17:16)

## 2021-05-28 NOTE — CONSULT NOTE ADULT - ASSESSMENT
35M  s/p hemorrhoidectomy 5/14/21 who presents with persistent bloody bowel movements. Presented 5/15 for pain, 5/17 for constipation, and 5/20 for bloody bowel movements. Patient having diarrhea after being d/c on Miralax. GI team being consulted for bleeding post hemorrhoidectomy 2 weeks ago. JSOE positive for red blood. Patient in extreme pain and not very compliant with tap water enemas. CTA was negative for extravasation.    IMPRESSION     35M  s/p hemorrhoidectomy 5/14/21 who presents with persistent bloody bowel movements. Presented 5/15 for pain, 5/17 for constipation, and 5/20 for bloody bowel movements. Patient having diarrhea after being d/c on Miralax. GI team being consulted for bleeding post hemorrhoidectomy 2 weeks ago. JOSE positive for red blood. Patient in extreme pain and not very compliant with tap water enemas. CTA was negative for extravasation.    IMPRESSION  Hemorrhoidectomy w/ Bleeding     Plan  -  35M  s/p hemorrhoidectomy 5/14/21 who presents with persistent bloody bowel movements. Presented 5/15 for pain, 5/17 for constipation, and 5/20 for bloody bowel movements. Patient having diarrhea after being d/c on Miralax. GI team being consulted for bleeding post hemorrhoidectomy 2 weeks ago. JOSE positive for red blood. Patient in extreme pain and not very compliant with tap water enemas. CTA was negative for extravasation.    IMPRESSION  Hemorrhoidectomy w/ Bleeding     Plan  - flex sigmoidoscopy today. Patient did take Go[employer]te[employer] around 2:45PM, to be d/w anethesia if can still be done  - repeat CBC to ensure H/H stable  - active T/S  - two large bore IV  - transfuse prn

## 2021-05-28 NOTE — PROVIDER CONTACT NOTE (OTHER) - SITUATION
Pt ordered for tap water enema prior to procedure but patient is refusing
pt blood pressure 91/53 pulse 74
Pt got enema at 1045 and no liquid has come out

## 2021-05-28 NOTE — ASU PATIENT PROFILE, ADULT - TOBACCO USE
How Severe Is Your Skin Lesion?: mild Has Your Skin Lesion Been Treated?: not been treated Is This A New Presentation, Or A Follow-Up?: Skin Lesions Never smoker

## 2021-05-28 NOTE — PROVIDER CONTACT NOTE (OTHER) - ASSESSMENT
Pt states he has had enema in the past and that it did not work and it was too painful
pt resting in bed. Pt Npo since midnight and on IV Fluids

## 2021-05-29 LAB
ANION GAP SERPL CALC-SCNC: 8 MMOL/L — SIGNIFICANT CHANGE UP (ref 7–14)
BASOPHILS # BLD AUTO: 0.02 K/UL — SIGNIFICANT CHANGE UP (ref 0–0.2)
BASOPHILS NFR BLD AUTO: 0.3 % — SIGNIFICANT CHANGE UP (ref 0–1)
BUN SERPL-MCNC: 11 MG/DL — SIGNIFICANT CHANGE UP (ref 10–20)
CALCIUM SERPL-MCNC: 8.9 MG/DL — SIGNIFICANT CHANGE UP (ref 8.5–10.1)
CHLORIDE SERPL-SCNC: 104 MMOL/L — SIGNIFICANT CHANGE UP (ref 98–110)
CO2 SERPL-SCNC: 25 MMOL/L — SIGNIFICANT CHANGE UP (ref 17–32)
CREAT SERPL-MCNC: 1.1 MG/DL — SIGNIFICANT CHANGE UP (ref 0.7–1.5)
EOSINOPHIL # BLD AUTO: 0.16 K/UL — SIGNIFICANT CHANGE UP (ref 0–0.7)
EOSINOPHIL NFR BLD AUTO: 2.2 % — SIGNIFICANT CHANGE UP (ref 0–8)
GLUCOSE SERPL-MCNC: 98 MG/DL — SIGNIFICANT CHANGE UP (ref 70–99)
HCT VFR BLD CALC: 25.8 % — LOW (ref 42–52)
HGB BLD-MCNC: 8.7 G/DL — LOW (ref 14–18)
IMM GRANULOCYTES NFR BLD AUTO: 0.1 % — SIGNIFICANT CHANGE UP (ref 0.1–0.3)
LYMPHOCYTES # BLD AUTO: 2.3 K/UL — SIGNIFICANT CHANGE UP (ref 1.2–3.4)
LYMPHOCYTES # BLD AUTO: 31.1 % — SIGNIFICANT CHANGE UP (ref 20.5–51.1)
MCHC RBC-ENTMCNC: 28.9 PG — SIGNIFICANT CHANGE UP (ref 27–31)
MCHC RBC-ENTMCNC: 33.7 G/DL — SIGNIFICANT CHANGE UP (ref 32–37)
MCV RBC AUTO: 85.7 FL — SIGNIFICANT CHANGE UP (ref 80–94)
MONOCYTES # BLD AUTO: 0.52 K/UL — SIGNIFICANT CHANGE UP (ref 0.1–0.6)
MONOCYTES NFR BLD AUTO: 7 % — SIGNIFICANT CHANGE UP (ref 1.7–9.3)
NEUTROPHILS # BLD AUTO: 4.39 K/UL — SIGNIFICANT CHANGE UP (ref 1.4–6.5)
NEUTROPHILS NFR BLD AUTO: 59.3 % — SIGNIFICANT CHANGE UP (ref 42.2–75.2)
NRBC # BLD: 0 /100 WBCS — SIGNIFICANT CHANGE UP (ref 0–0)
PLATELET # BLD AUTO: 274 K/UL — SIGNIFICANT CHANGE UP (ref 130–400)
POTASSIUM SERPL-MCNC: 4.2 MMOL/L — SIGNIFICANT CHANGE UP (ref 3.5–5)
POTASSIUM SERPL-SCNC: 4.2 MMOL/L — SIGNIFICANT CHANGE UP (ref 3.5–5)
RBC # BLD: 3.01 M/UL — LOW (ref 4.7–6.1)
RBC # FLD: 12.6 % — SIGNIFICANT CHANGE UP (ref 11.5–14.5)
SODIUM SERPL-SCNC: 137 MMOL/L — SIGNIFICANT CHANGE UP (ref 135–146)
WBC # BLD: 7.4 K/UL — SIGNIFICANT CHANGE UP (ref 4.8–10.8)
WBC # FLD AUTO: 7.4 K/UL — SIGNIFICANT CHANGE UP (ref 4.8–10.8)

## 2021-05-29 RX ADMIN — PANTOPRAZOLE SODIUM 40 MILLIGRAM(S): 20 TABLET, DELAYED RELEASE ORAL at 12:10

## 2021-05-29 RX ADMIN — HEPARIN SODIUM 5000 UNIT(S): 5000 INJECTION INTRAVENOUS; SUBCUTANEOUS at 06:11

## 2021-05-29 RX ADMIN — SODIUM CHLORIDE 100 MILLILITER(S): 9 INJECTION, SOLUTION INTRAVENOUS at 06:13

## 2021-05-29 RX ADMIN — CHLORHEXIDINE GLUCONATE 1 APPLICATION(S): 213 SOLUTION TOPICAL at 07:57

## 2021-05-30 VITALS
TEMPERATURE: 98 F | HEART RATE: 67 BPM | SYSTOLIC BLOOD PRESSURE: 92 MMHG | RESPIRATION RATE: 18 BRPM | DIASTOLIC BLOOD PRESSURE: 56 MMHG

## 2021-05-30 RX ORDER — POLYETHYLENE GLYCOL 3350 17 G/17G
17 POWDER, FOR SOLUTION ORAL
Qty: 0 | Refills: 0 | DISCHARGE
Start: 2021-05-30

## 2021-05-30 RX ADMIN — POLYETHYLENE GLYCOL 3350 17 GRAM(S): 17 POWDER, FOR SOLUTION ORAL at 11:05

## 2021-05-30 RX ADMIN — PANTOPRAZOLE SODIUM 40 MILLIGRAM(S): 20 TABLET, DELAYED RELEASE ORAL at 11:05

## 2021-05-30 NOTE — PROGRESS NOTE ADULT - ASSESSMENT
35M w/ PMH of hemorrhoids s/p hemorrhoidectomy 5/14/21 who presents with persistent bloody bowel movements after an episode of straining this morning.    PLAN:   - please repeat CBC  - CTA A/P to eval for active bleeding  - d/w Dr. Valdez
Assessment:  35M w/ PMH of hemorrhoids s/p hemorrhoidectomy 5/14/21 who presents with persistent bloody bowel movements after an episode of straining this morning. Patient underwent flexible sigmoidoscopy 5/28. Scope was traversed until 45cm from anal verge. No blood was noted in rectum and sigmoid colon. Minimal oozing was noted at the site of recent hemorrhoidectomy and sutures.        PLAN:   - Continue to trend HG  - Transfuse if warranted  - Avoid constipation with GI prophalaxis    Date/Time: 05-29-21 @ 03:06
Assessment:  35y Male with LA bleeding after hemorrhoidectomy. S/p sigmoidectomy 2 days ago showing some oozing at surgical site otherwise no other active bleeding. Baseline BP soft 's SBP, asymptomatic. H/H stable.    Plan:  - likely discharge today

## 2021-05-30 NOTE — DISCHARGE NOTE PROVIDER - NSDCACTIVITY_GEN_ALL_CORE
Return to Work/School allowed/Bathing allowed/Showering allowed/Stairs allowed/Walking - Indoors allowed/No heavy lifting/straining/Walking - Outdoors allowed

## 2021-05-30 NOTE — DISCHARGE NOTE NURSING/CASE MANAGEMENT/SOCIAL WORK - NSDCFUADDAPPT_GEN_ALL_CORE_FT
Please continue Miralax at home and follow up with the gastroenterologist as outpatient. Please follow up with Dr. Javier as outpatient in 2 weeks.

## 2021-05-30 NOTE — DISCHARGE NOTE PROVIDER - NSDCCPCAREPLAN_GEN_ALL_CORE_FT
PRINCIPAL DISCHARGE DIAGNOSIS  Diagnosis: Hemorrhoids, unspecified hemorrhoid type  Assessment and Plan of Treatment: Please continue miralax at home and follow up with the gastroenterologist as outpatient. Please follow up with Dr. Javier as outpatient in 2 weeks.      SECONDARY DISCHARGE DIAGNOSES  Diagnosis: Anemia, unspecified type  Assessment and Plan of Treatment:

## 2021-05-30 NOTE — DISCHARGE NOTE PROVIDER - NSDCFUADDAPPT_GEN_ALL_CORE_FT
Please continue Miralax at home and follow up with the gastroenterologist as outpatient. Please follow up with Dr. aJvier as outpatient in 2 weeks.

## 2021-05-30 NOTE — DISCHARGE NOTE PROVIDER - HOSPITAL COURSE
35M w/ PMH of hemorrhoids s/p hemorrhoidectomy 5/14/21 who presents with persistent bloody bowel movements. Patient underwent sigmoidoscopy that was traversed until 45cm from the anal verge without any findings of blood in the rectum or sigmoid colon although minimal oozing was noted at the site of recent hemorrhoidectomy and sutures. Patient is advised to continue Miralax at home. Patient will follow up as outpatient with gastroenterologist. Patient will be discharged today to go home and will also follow up in 2 weeks at the clinic with Dr. Javier.

## 2021-05-30 NOTE — DISCHARGE NOTE NURSING/CASE MANAGEMENT/SOCIAL WORK - PATIENT PORTAL LINK FT
You can access the FollowMyHealth Patient Portal offered by [facility] by registering at the following website: [email or web address]. By joining [employer]’s FollowMyHealth portal, you will also be able to view your health information using other applications (apps) compatible with our system.

## 2021-05-30 NOTE — DISCHARGE NOTE PROVIDER - NSDCFUADDINST_GEN_ALL_CORE_FT
Please continue Miralax at home and follow up with the gastroenterologist as outpatient. Please follow up with Dr. Javier as outpatient in 2 weeks. Please go to the emergency for any bleeding, extreme pain, difficulty breathing or any other new and acute symptoms.

## 2021-05-30 NOTE — PROGRESS NOTE ADULT - SUBJECTIVE AND OBJECTIVE BOX
GENERAL SURGERY PROGRESS NOTE     ADAM KIMBROUGH  35y  Male  Hospital day :3d    OVERNIGHT EVENTS: no acute events overnight     T(F): 98.5 (21 @ 21:09), Max: 99 (21 @ 17:06)  HR: 77 (21 @ 21:09) (77 - 101)  BP: 95/57 (21 @ 21:09) (95/57 - 104/60)  RR: 18 (21 @ 21:09) (18 - 20)       GI proph:  pantoprazole  Injectable 40 milliGRAM(s) IV Push daily    AC/ proph: heparin   Injectable 5000 Unit(s) SubCutaneous every 8 hours    PHYSICAL EXAM:  GENERAL: NAD, well-appearing  CHEST/LUNG: Clear to auscultation bilaterally  HEART: Regular rate and rhythm  ABDOMEN: Soft, Nontender, Nondistended;   EXTREMITIES:  No clubbing, cyanosis, or edema    LABS  CAPILLARY BLOOD GLUCOSE                              8.7    7.40  )-----------( 274      ( 29 May 2021 18:01 )             25.8       Auto Neutrophil %: 59.3 % (21 @ 18:01)  Auto Immature Granulocyte %: 0.1 % (21 @ 18:01)        137  |  104  |  11  ----------------------------<  98  4.2   |  25  |  1.1      Calcium, Total Serum: 8.9 mg/dL (21 @ 18:01)      LFTs:         Coags:     13.60  ----< 1.18    ( 28 May 2021 04:05 )     28.5        Urinalysis Basic - ( 28 May 2021 10:50 )    Color: Colorless / Appearance: Clear / S.013 / pH: x  Gluc: x / Ketone: Small  / Bili: Negative / Urobili: <2 mg/dL   Blood: x / Protein: Negative / Nitrite: Negative   Leuk Esterase: Negative / RBC: x / WBC x   Sq Epi: x / Non Sq Epi: x / Bacteria: x      
Progress Note: General Surgery  Patient: ADAM KIMBROUGH , 35y (1985)Male   MRN: [id number]  Location: [address]  Visit: 21 Inpatient  Date: 21 @ 03:06    Admit Diagnosis/Chief Complaint:     Procedure/Diagnosis:  S/P  POD# ***    Events/ 24h: No acute events overnight. Pain controlled.    Vitals: T(F): 97.6 (21 @ 01:18), Max: 98.5 (21 @ 19:24)  HR: 101 (21 01:18)  BP: 104/57 (21 01:18) (91/53 - 167/88)  RR: 18 (21 01:18)  SpO2: 100% (21 @ 19:54)    In:   21 @ 07:01  -  21 @ 07:00  --------------------------------------------------------  IN: 1600 mL    21 @ 07:01  -  21 @ 03:06  --------------------------------------------------------  IN: 1100 mL      Out:   21 @ 07:01  -  21 @ 07:00  --------------------------------------------------------  OUT:  Total OUT: 0 mL      21 @ 07:01  -  21 @ 03:06  --------------------------------------------------------  OUT:    Voided (mL): 1200 mL  Total OUT: 1200 mL        Net:   21 @ 07:01  -  21 @ 07:00  --------------------------------------------------------  NET: 1600 mL    21 @ 07:01  -  21 @ 03:06  --------------------------------------------------------  NET: -100 mL        Diet: Diet, Clear Liquid (21 @ 22:59)    IV Fluids: lactated ringers. 1000 milliLiter(s) (100 mL/Hr) IV Continuous <Continuous>      PHYSICAL EXAM:  GENERAL: NAD, well-appearing  CHEST/LUNG: Clear to auscultation bilaterally  HEART: Regular rate and rhythm  ABDOMEN: Soft, Nontender, Nondistended;   EXTREMITIES:  No clubbing, cyanosis, or edema    Medications: [Standing]  chlorhexidine 4% Liquid 1 Application(s) Topical <User Schedule>  heparin   Injectable 5000 Unit(s) SubCutaneous every 8 hours  lactated ringers. 1000 milliLiter(s) (100 mL/Hr) IV Continuous <Continuous>  pantoprazole  Injectable 40 milliGRAM(s) IV Push daily  polyethylene glycol 3350 17 Gram(s) Oral daily  senna 2 Tablet(s) Oral at bedtime    DVT Prophylaxis: heparin   Injectable 5000 Unit(s) SubCutaneous every 8 hours    GI Prophylaxis: pantoprazole  Injectable 40 milliGRAM(s) IV Push daily    Antibiotics:   Anticoagulation:   Medications:[PRN]  acetaminophen   Tablet .. 650 milliGRAM(s) Oral every 6 hours PRN      Labs:                        8.7    6.05  )-----------( 213      ( 28 May 2021 20:57 )             26.0         141  |  104  |  11  ----------------------------<  81  4.0   |  26  |  1.0    Ca    8.9      28 May 2021 20:57  Phos  3.3     -  Mg     1.9     -    TPro  6.2  /  Alb  4.1  /  TBili  0.3  /  DBili  x   /  AST  18  /  ALT  23  /  AlkPhos  65  -    LIVER FUNCTIONS - ( 27 May 2021 14:50 )  Alb: 4.1 g/dL / Pro: 6.2 g/dL / ALK PHOS: 65 U/L / ALT: 23 U/L / AST: 18 U/L / GGT: x           PT/INR - ( 28 May 2021 04:05 )   PT: 13.60 sec;   INR: 1.18 ratio       PTT - ( 28 May 2021 04:05 )  PTT:28.5 sec    Urine/Micro:    Urinalysis Basic - ( 28 May 2021 10:50 )    Color: Colorless / Appearance: Clear / S.013 / pH: x  Gluc: x / Ketone: Small  / Bili: Negative / Urobili: <2 mg/dL   Blood: x / Protein: Negative / Nitrite: Negative   Leuk Esterase: Negative / RBC: x / WBC x   Sq Epi: x / Non Sq Epi: x / Bacteria: x      
GENERAL SURGERY PROGRESS NOTE     ADAM KIMBROUGH  35y  Male  Hospital day :1d  POD:  Procedure:   OVERNIGHT EVENTS:    T(F): 98.3 (05-28-21 @ 05:25), Max: 98.3 (05-28-21 @ 05:25)  HR: 74 (05-28-21 @ 05:25) (74 - 94)  BP: 104/52 (05-28-21 @ 05:25) (91/53 - 110/66)    RR: 18 (05-28-21 @ 05:25) (18 - 20)  SpO2: 99% (05-28-21 @ 03:18) (98% - 99%)    DIET/FLUIDS: lactated ringers. 1000 milliLiter(s) IV Continuous <Continuous>       GI proph:  pantoprazole  Injectable 40 milliGRAM(s) IV Push daily    AC/ proph: enoxaparin Injectable 40 milliGRAM(s) SubCutaneous daily    PHYSICAL EXAM:  GENERAL: NAD, well-appearing  CHEST/LUNG: Clear to auscultation bilaterally  HEART: Regular rate and rhythm  ABDOMEN: Soft, Nontender, Nondistended;   EXTREMITIES:  No clubbing, cyanosis, or edema    LABS  CAPILLARY BLOOD GLUCOSE                              8.5    6.22  )-----------( 212      ( 28 May 2021 04:05 )             24.3       Auto Neutrophil %: 58.0 % (05-28-21 @ 04:05)  Auto Immature Granulocyte %: 0.5 % (05-28-21 @ 04:05)  Auto Immature Granulocyte %: 0.6 % (05-27-21 @ 14:50)  Auto Neutrophil %: 82.6 % (05-27-21 @ 14:50)  Auto Neutrophil %: 85.4 % (05-27-21 @ 12:50)  Auto Immature Granulocyte %: 0.7 % (05-27-21 @ 12:50)    05-28    139  |  105  |  17  ----------------------------<  92  4.0   |  27  |  1.1      Calcium, Total Serum: 8.8 mg/dL (05-28-21 @ 04:05)      LFTs:             6.2  | 0.3  | 18       ------------------[65      ( 27 May 2021 14:50 )  4.1  | x    | 23          Lipase:x      Amylase:x             Coags:     13.60  ----< 1.18    ( 28 May 2021 04:05 )     28.5

## 2021-05-30 NOTE — DISCHARGE NOTE PROVIDER - CARE PROVIDER_API CALL
Katia Javier  SURGERY  [address] [place] [address]  Phone: [phone]  Fax: [phone]  Follow Up Time: 2 weeks

## 2021-06-09 DIAGNOSIS — K62.5 HEMORRHAGE OF ANUS AND RECTUM: ICD-10-CM
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
## 2021-06-09 DIAGNOSIS — K91.840 POSTPROCEDURAL HEMORRHAGE OF A DIGESTIVE SYSTEM ORGAN OR STRUCTURE FOLLOWING A DIGESTIVE SYSTEM PROCEDURE: ICD-10-CM

## 2021-06-09 DIAGNOSIS — K59.00 CONSTIPATION, UNSPECIFIED: ICD-10-CM

## 2021-06-09 DIAGNOSIS — D64.9 ANEMIA, UNSPECIFIED: ICD-10-CM

## 2022-04-18 NOTE — ASU PATIENT PROFILE, ADULT - HEALTH/HEALTHCARE ANXIETIES, PROFILE
Patient's daughter Anita Anita back: [phone]   n/a

## 2023-01-26 NOTE — ED PROVIDER NOTE - NSCAREINITIATED _GEN_ER
[Subsequent Evaluation] : a subsequent evaluation for [Hearing Loss] : hearing loss [Tinnitus] : tinnitus [FreeTextEntry2] : follow up  Victorino Muro(Attending)

## 2023-09-02 NOTE — ED ADULT NURSE NOTE - CHIEF COMPLAINT QUOTE
Stable CKD stage 3 since admission at last hospitalization, was seen by DR. Aldana in the outpatient setting. Planned to undergo above procedure to improve function of left kidney.   - Renally dose medications  - Care with nephrotoxic medications pt sent in to be admitted to surgery for hemorrhoids as per dr mccormick

## 2023-09-18 NOTE — ED ADULT TRIAGE NOTE - SPO2 (%)
Complete Echo prior next office visit   Follow up in cardiology clinic in 4 months or sooner if needed   Follow up with PCP as directed  
98

## 2025-01-20 NOTE — ASU PREOP CHECKLIST - AS TEMP SITE
HDL 42 09/17/2024    LDL 42 09/17/2024     ALT   Date Value Ref Range Status   09/17/2024 23 10 - 40 U/L Final     AST   Date Value Ref Range Status   09/17/2024 29 15 - 37 U/L Final     The ASCVD Risk score (Amandeep COVINGTON, et al., 2019) failed to calculate for the following reasons:    The patient has a prior MI or stroke diagnosis     Lab Results   Component Value Date    CREATININE 1.1 11/18/2024     Estimated Creatinine Clearance: 95 mL/min (based on SCr of 1.1 mg/dL).  Lab Results   Component Value Date/Time    LABGLOM 78 11/18/2024 08:33 AM    LABGLOM 64 09/17/2024 10:13 AM    LABGLOM 59 06/11/2024 09:18 AM    LABGLOM 64 05/29/2024 05:25 AM    LABGLOM 64 04/03/2024 02:50 PM    LABGLOM >60 04/25/2023 08:26 AM     Albumin/Creatinine Ratio   Date/Time Value Ref Range Status   09/17/2024 10:14 AM see below 0.0 - 30.0 mg/g Final     Comment:     - UR Microalbumin concentration is less than 1.2 mg/dL.  - Unable to calculate Microalbumin/Creatinine Ratio without    a Microalbumin concentration.         Immunizations:  Immunization History   Administered Date(s) Administered    COVID-19, MODERNA BLUE border, Primary or Immunocompromised, (age 12y+), IM, 100 mcg/0.5mL 04/21/2021, 05/19/2021    Influenza, FLUARIX, FLULAVAL, FLUZONE (age 6 mo+) and AFLURIA, (age 3 y+), Quadv PF, 0.5mL 10/17/2018, 12/30/2019, 12/14/2020    Influenza, FLUCELVAX, (age 6 mo+) IM, Trivalent PF, 0.5mL 09/17/2024    Influenza, FLUCELVAX, (age 6 mo+), MDCK, Quadv PF, 0.5mL 01/06/2022, 10/22/2022, 10/02/2023    Pneumococcal, PPSV23, PNEUMOVAX 23, (age 2y+), SC/IM, 0.5mL 10/17/2018, 02/28/2022    TDaP, ADACEL (age 10y-64y), BOOSTRIX (age 10y+), IM, 0.5mL 05/08/2015    Zoster Recombinant (Shingrix) 02/28/2022, 09/29/2022      Social History:  Social History     Tobacco Use    Smoking status: Never    Smokeless tobacco: Never   Substance Use Topics    Alcohol use: Yes     Alcohol/week: 1.0 standard drink of alcohol     Types: 1 Cans of beer per week 
oral